# Patient Record
Sex: MALE | Race: WHITE | Employment: FULL TIME | URBAN - NONMETROPOLITAN AREA
[De-identification: names, ages, dates, MRNs, and addresses within clinical notes are randomized per-mention and may not be internally consistent; named-entity substitution may affect disease eponyms.]

---

## 2017-12-21 ENCOUNTER — OFFICE VISIT (OUTPATIENT)
Dept: NEUROLOGY | Age: 37
End: 2017-12-21
Payer: COMMERCIAL

## 2017-12-21 ENCOUNTER — HOSPITAL ENCOUNTER (OUTPATIENT)
Dept: GENERAL RADIOLOGY | Age: 37
Discharge: HOME | End: 2017-12-21
Payer: COMMERCIAL

## 2017-12-21 ENCOUNTER — HOSPITAL ENCOUNTER (OUTPATIENT)
Dept: LAB | Age: 37
Setting detail: SPECIMEN
Discharge: HOME | End: 2017-12-21
Payer: COMMERCIAL

## 2017-12-21 VITALS — DIASTOLIC BLOOD PRESSURE: 66 MMHG | OXYGEN SATURATION: 98 % | SYSTOLIC BLOOD PRESSURE: 118 MMHG | HEART RATE: 58 BPM

## 2017-12-21 DIAGNOSIS — G40.909 SEIZURE DISORDER (HCC): ICD-10-CM

## 2017-12-21 DIAGNOSIS — R53.82 CHRONIC FATIGUE: ICD-10-CM

## 2017-12-21 DIAGNOSIS — F39 MOOD DISORDER (HCC): ICD-10-CM

## 2017-12-21 DIAGNOSIS — R42 VERTIGO: ICD-10-CM

## 2017-12-21 DIAGNOSIS — M54.2 NECK PAIN: ICD-10-CM

## 2017-12-21 DIAGNOSIS — Q28.3 CEREBRAL CAVERNOMA: ICD-10-CM

## 2017-12-21 DIAGNOSIS — S09.90XS CLOSED HEAD INJURY, SEQUELA: ICD-10-CM

## 2017-12-21 DIAGNOSIS — R55 NEAR SYNCOPE: ICD-10-CM

## 2017-12-21 DIAGNOSIS — M54.40 CHRONIC LOW BACK PAIN WITH SCIATICA, SCIATICA LATERALITY UNSPECIFIED, UNSPECIFIED BACK PAIN LATERALITY: ICD-10-CM

## 2017-12-21 DIAGNOSIS — J32.4 CHRONIC PANSINUSITIS: ICD-10-CM

## 2017-12-21 DIAGNOSIS — R42 DIZZINESS: ICD-10-CM

## 2017-12-21 DIAGNOSIS — G43.009 MIGRAINE WITHOUT AURA AND WITHOUT STATUS MIGRAINOSUS, NOT INTRACTABLE: ICD-10-CM
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
## 2017-12-21 DIAGNOSIS — G89.29 CHRONIC LOW BACK PAIN WITH SCIATICA, SCIATICA LATERALITY UNSPECIFIED, UNSPECIFIED BACK PAIN LATERALITY: ICD-10-CM

## 2017-12-21 DIAGNOSIS — F34.1 DYSTHYMIA: ICD-10-CM

## 2017-12-21 DIAGNOSIS — R41.3 MEMORY PROBLEM: ICD-10-CM

## 2017-12-21 DIAGNOSIS — R90.89 ABNORMAL FINDING ON MRI OF BRAIN: ICD-10-CM

## 2017-12-21 DIAGNOSIS — G43.719 INTRACTABLE CHRONIC MIGRAINE WITHOUT AURA AND WITHOUT STATUS MIGRAINOSUS: Primary | ICD-10-CM

## 2017-12-21 DIAGNOSIS — G47.9 SLEEP DIFFICULTIES: ICD-10-CM

## 2017-12-21 LAB
ALBUMIN SERPL-MCNC: 4.5 G/DL (ref 3.5–5.2)
ALBUMIN/GLOBULIN RATIO: 1.2 (ref 1–2.5)
ALP BLD-CCNC: 93 U/L (ref 40–129)
ALT SERPL-CCNC: 26 U/L (ref 5–41)
AMPHETAMINE SCREEN URINE: NEGATIVE
ANION GAP SERPL CALCULATED.3IONS-SCNC: 10 MMOL/L (ref 9–17)
AST SERPL-CCNC: 25 U/L
BARBITURATE SCREEN URINE: NEGATIVE
BENZODIAZEPINE SCREEN, URINE: NEGATIVE
BILIRUB SERPL-MCNC: 0.72 MG/DL (ref 0.3–1.2)
BUN BLDV-MCNC: 16 MG/DL (ref 6–20)
BUN/CREAT BLD: 20 (ref 9–20)
BUPRENORPHINE URINE: NEGATIVE
CALCIUM SERPL-MCNC: 9.6 MG/DL (ref 8.6–10.4)
CANNABINOID SCREEN URINE: NEGATIVE
CHLORIDE BLD-SCNC: 100 MMOL/L (ref 98–107)
CO2: 29 MMOL/L (ref 20–31)
COCAINE METABOLITE, URINE: NEGATIVE
CREAT SERPL-MCNC: 0.79 MG/DL (ref 0.7–1.2)
GFR AFRICAN AMERICAN: >60 ML/MIN
GFR NON-AFRICAN AMERICAN: >60 ML/MIN
GFR SERPL CREATININE-BSD FRML MDRD: ABNORMAL ML/MIN/{1.73_M2}
GFR SERPL CREATININE-BSD FRML MDRD: ABNORMAL ML/MIN/{1.73_M2}
GLUCOSE BLD-MCNC: 96 MG/DL (ref 70–99)
HCT VFR BLD CALC: 44.4 % (ref 41–53)
HEMOGLOBIN: 15.3 G/DL (ref 13.5–17.5)
MCH RBC QN AUTO: 31 PG (ref 26–34)
MCHC RBC AUTO-ENTMCNC: 34.4 G/DL (ref 31–37)
MCV RBC AUTO: 90.2 FL (ref 80–100)
MDMA URINE: NORMAL
METHADONE SCREEN, URINE: NEGATIVE
METHAMPHETAMINE, URINE: NEGATIVE
OPIATES, URINE: NEGATIVE
OXYCODONE SCREEN URINE: NEGATIVE
PDW BLD-RTO: 12.6 % (ref 11–14.5)
PHENCYCLIDINE, URINE: NEGATIVE
PLATELET # BLD: 278 K/UL (ref 140–450)
PMV BLD AUTO: 6.8 FL (ref 6–12)
POTASSIUM SERPL-SCNC: 4.5 MMOL/L (ref 3.7–5.3)
PROPOXYPHENE, URINE: NEGATIVE
RBC # BLD: 4.93 M/UL (ref 4.5–5.9)
RHEUMATOID FACTOR: <10 IU/ML
SEDIMENTATION RATE, ERYTHROCYTE: 26 MM (ref 0–15)
SODIUM BLD-SCNC: 139 MMOL/L (ref 135–144)
T. PALLIDUM, IGG: NONREACTIVE
TEST INFORMATION: NORMAL
TOTAL PROTEIN: 8.4 G/DL (ref 6.4–8.3)
TRICYCLIC ANTIDEPRESSANTS, UR: NEGATIVE
TSH SERPL DL<=0.05 MIU/L-ACNC: 1.81 MIU/L (ref 0.3–5)
WBC # BLD: 6.6 K/UL (ref 3.5–11)

## 2017-12-21 PROCEDURE — G8484 FLU IMMUNIZE NO ADMIN: HCPCS | Performed by: PSYCHIATRY & NEUROLOGY

## 2017-12-21 PROCEDURE — 86038 ANTINUCLEAR ANTIBODIES: CPT

## 2017-12-21 PROCEDURE — 72050 X-RAY EXAM NECK SPINE 4/5VWS: CPT

## 2017-12-21 PROCEDURE — 85613 RUSSELL VIPER VENOM DILUTED: CPT

## 2017-12-21 PROCEDURE — 85610 PROTHROMBIN TIME: CPT

## 2017-12-21 PROCEDURE — 85027 COMPLETE CBC AUTOMATED: CPT

## 2017-12-21 PROCEDURE — 84443 ASSAY THYROID STIM HORMONE: CPT

## 2017-12-21 PROCEDURE — 86780 TREPONEMA PALLIDUM: CPT

## 2017-12-21 PROCEDURE — 36415 COLL VENOUS BLD VENIPUNCTURE: CPT

## 2017-12-21 PROCEDURE — G8427 DOCREV CUR MEDS BY ELIG CLIN: HCPCS | Performed by: PSYCHIATRY & NEUROLOGY

## 2017-12-21 PROCEDURE — 86431 RHEUMATOID FACTOR QUANT: CPT

## 2017-12-21 PROCEDURE — 85651 RBC SED RATE NONAUTOMATED: CPT

## 2017-12-21 PROCEDURE — 1036F TOBACCO NON-USER: CPT | Performed by: PSYCHIATRY & NEUROLOGY

## 2017-12-21 PROCEDURE — 82607 VITAMIN B-12: CPT

## 2017-12-21 PROCEDURE — 85730 THROMBOPLASTIN TIME PARTIAL: CPT

## 2017-12-21 PROCEDURE — 99205 OFFICE O/P NEW HI 60 MIN: CPT | Performed by: PSYCHIATRY & NEUROLOGY

## 2017-12-21 PROCEDURE — 82746 ASSAY OF FOLIC ACID SERUM: CPT

## 2017-12-21 PROCEDURE — 86147 CARDIOLIPIN ANTIBODY EA IG: CPT

## 2017-12-21 PROCEDURE — G8421 BMI NOT CALCULATED: HCPCS | Performed by: PSYCHIATRY & NEUROLOGY

## 2017-12-21 PROCEDURE — 80306 DRUG TEST PRSMV INSTRMNT: CPT

## 2017-12-21 PROCEDURE — 80053 COMPREHEN METABOLIC PANEL: CPT

## 2017-12-21 RX ORDER — ACETAMINOPHEN 325 MG/1
650 TABLET ORAL EVERY 6 HOURS PRN
COMMUNITY

## 2017-12-21 ASSESSMENT — ENCOUNTER SYMPTOMS
EYE REDNESS: 0
BLOOD IN STOOL: 0
PHOTOPHOBIA: 1
WHEEZING: 0
APNEA: 0
EYE WATERING: 0
CONSTIPATION: 0
FACIAL SWEATING: 0
ABDOMINAL PAIN: 0
CHEST TIGHTNESS: 0
VOMITING: 0
COUGH: 0
VOICE CHANGE: 0
EYE ITCHING: 0
EYE DISCHARGE: 0
BACK PAIN: 1
FACIAL SWELLING: 0
CHANGE IN BOWEL HABIT: 0
BLURRED VISION: 0
ABDOMINAL DISTENTION: 0
SWOLLEN GLANDS: 0
SCALP TENDERNESS: 0
NAUSEA: 0
BOWEL INCONTINENCE: 0
SINUS PRESSURE: 0
SHORTNESS OF BREATH: 0
EYE PAIN: 0
DIARRHEA: 0
COLOR CHANGE: 0
VISUAL CHANGE: 0
TROUBLE SWALLOWING: 0
SORE THROAT: 0
CHOKING: 0
RHINORRHEA: 0
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
## 2017-12-22 LAB
ANTI-NUCLEAR ANTIBODY (ANA): NEGATIVE
FOLATE: 9.9 NG/ML
VITAMIN B-12: 569 PG/ML (ref 232–1245)

## 2017-12-25 LAB
ANTICARDIOLIPIN IGA ANTIBODY: 1.4 APU
ANTICARDIOLIPIN IGG ANTIBODY: 3 GPU
CARDIOLIPIN AB IGM: 4 MPU
DILUTE RUSSELL VIPER VENOM TIME: NORMAL
INR BLD: 0.9
LUPUS ANTICOAG: NORMAL
PARTIAL THROMBOPLASTIN TIME: 30.7 SEC (ref 21.3–31.3)
PROTHROMBIN TIME: 10 SEC (ref 9.4–12.6)

## 2018-01-11 ENCOUNTER — HOSPITAL ENCOUNTER (OUTPATIENT)
Dept: MRI IMAGING | Age: 38
Discharge: HOME | End: 2018-01-11
Payer: COMMERCIAL

## 2018-01-11 ENCOUNTER — HOSPITAL ENCOUNTER (OUTPATIENT)
Dept: INTERVENTIONAL RADIOLOGY/VASCULAR | Age: 38
Discharge: HOME | End: 2018-01-11
Payer: COMMERCIAL

## 2018-01-11 DIAGNOSIS — R55 NEAR SYNCOPE: ICD-10-CM

## 2018-01-11 DIAGNOSIS — M54.2 NECK PAIN: ICD-10-CM

## 2018-01-11 DIAGNOSIS — S09.90XS CLOSED HEAD INJURY, SEQUELA: ICD-10-CM

## 2018-01-11 DIAGNOSIS — G43.009 MIGRAINE WITHOUT AURA AND WITHOUT STATUS MIGRAINOSUS, NOT INTRACTABLE: ICD-10-CM

## 2018-01-11 DIAGNOSIS — R41.3 MEMORY PROBLEM: ICD-10-CM

## 2018-01-11 DIAGNOSIS — R42 DIZZINESS: ICD-10-CM

## 2018-01-11 DIAGNOSIS — R42 VERTIGO: ICD-10-CM

## 2018-01-11 DIAGNOSIS — G40.909 SEIZURE DISORDER (HCC): ICD-10-CM

## 2018-01-11 PROCEDURE — 93880 EXTRACRANIAL BILAT STUDY: CPT

## 2018-01-11 PROCEDURE — 70544 MR ANGIOGRAPHY HEAD W/O DYE: CPT

## 2018-01-26 ENCOUNTER — HOSPITAL ENCOUNTER (OUTPATIENT)
Dept: NEUROLOGY | Age: 38
Discharge: HOME | End: 2018-01-26
Payer: COMMERCIAL

## 2018-01-26 DIAGNOSIS — R55 NEAR SYNCOPE: ICD-10-CM

## 2018-01-26 DIAGNOSIS — M54.2 NECK PAIN: ICD-10-CM

## 2018-01-26 DIAGNOSIS — G43.009 MIGRAINE WITHOUT AURA AND WITHOUT STATUS MIGRAINOSUS, NOT INTRACTABLE: ICD-10-CM

## 2018-01-26 DIAGNOSIS — R42 VERTIGO: ICD-10-CM

## 2018-01-26 DIAGNOSIS — G40.909 SEIZURE DISORDER (HCC): ICD-10-CM

## 2018-01-26 DIAGNOSIS — R41.3 MEMORY PROBLEM: ICD-10-CM

## 2018-01-26 DIAGNOSIS — R42 DIZZINESS: ICD-10-CM

## 2018-01-26 DIAGNOSIS — S09.90XS CLOSED HEAD INJURY, SEQUELA: ICD-10-CM

## 2018-01-26 PROCEDURE — 93892 TCD EMBOLI DETECT W/O INJ: CPT

## 2018-01-26 PROCEDURE — 93886 INTRACRANIAL COMPLETE STUDY: CPT

## 2018-01-26 NOTE — PROGRESS NOTES
TCD Completed With [facility]. PCP: Carole Fox    Ordering: Sally Mendoza Neurologist    Interpreting Physician: Xuan Garvey    Technician: Braydon Del Toro LPN

## 2018-04-20 ENCOUNTER — OFFICE VISIT (OUTPATIENT)
Dept: NEUROLOGY | Age: 38
End: 2018-04-20
Payer: COMMERCIAL

## 2018-04-20 VITALS
OXYGEN SATURATION: 98 % | BODY MASS INDEX: 30.22 KG/M2 | HEIGHT: 65 IN | DIASTOLIC BLOOD PRESSURE: 62 MMHG | SYSTOLIC BLOOD PRESSURE: 104 MMHG | WEIGHT: 181.4 LBS | HEART RATE: 56 BPM

## 2018-04-20 DIAGNOSIS — S09.90XS CLOSED HEAD INJURY, SEQUELA: ICD-10-CM

## 2018-04-20 DIAGNOSIS — J32.4 CHRONIC PANSINUSITIS: ICD-10-CM

## 2018-04-20 DIAGNOSIS — M54.40 CHRONIC LOW BACK PAIN WITH SCIATICA, SCIATICA LATERALITY UNSPECIFIED, UNSPECIFIED BACK PAIN LATERALITY: ICD-10-CM

## 2018-04-20 DIAGNOSIS — F39 MOOD DISORDER (HCC): ICD-10-CM

## 2018-04-20 DIAGNOSIS — G40.909 SEIZURE DISORDER (HCC): ICD-10-CM

## 2018-04-20 DIAGNOSIS — G47.9 SLEEP DIFFICULTIES: ICD-10-CM

## 2018-04-20 DIAGNOSIS — R90.89 ABNORMAL FINDING ON MRI OF BRAIN: ICD-10-CM

## 2018-04-20 DIAGNOSIS — Q28.3 CEREBRAL CAVERNOMA: ICD-10-CM

## 2018-04-20 DIAGNOSIS — G44.209 TENSION-TYPE HEADACHE, NOT INTRACTABLE, UNSPECIFIED CHRONICITY PATTERN: ICD-10-CM

## 2018-04-20 DIAGNOSIS — R42 DIZZINESS: ICD-10-CM

## 2018-04-20 DIAGNOSIS — G89.29 CHRONIC LOW BACK PAIN WITH SCIATICA, SCIATICA LATERALITY UNSPECIFIED, UNSPECIFIED BACK PAIN LATERALITY: ICD-10-CM

## 2018-04-20 DIAGNOSIS — G43.719 INTRACTABLE CHRONIC MIGRAINE WITHOUT AURA AND WITHOUT STATUS MIGRAINOSUS: Primary | ICD-10-CM

## 2018-04-20 DIAGNOSIS — R41.3 MEMORY PROBLEM: ICD-10-CM

## 2018-04-20 DIAGNOSIS — R42 VERTIGO: ICD-10-CM

## 2018-04-20 DIAGNOSIS — M54.2 NECK PAIN: ICD-10-CM

## 2018-04-20 DIAGNOSIS — R53.82 CHRONIC FATIGUE: ICD-10-CM

## 2018-04-20 DIAGNOSIS — R55 NEAR SYNCOPE: ICD-10-CM

## 2018-04-20 PROCEDURE — 1036F TOBACCO NON-USER: CPT | Performed by: PSYCHIATRY & NEUROLOGY

## 2018-04-20 PROCEDURE — 99215 OFFICE O/P EST HI 40 MIN: CPT | Performed by: PSYCHIATRY & NEUROLOGY

## 2018-04-20 PROCEDURE — G8417 CALC BMI ABV UP PARAM F/U: HCPCS | Performed by: PSYCHIATRY & NEUROLOGY

## 2018-04-20 PROCEDURE — G8427 DOCREV CUR MEDS BY ELIG CLIN: HCPCS | Performed by: PSYCHIATRY & NEUROLOGY

## 2018-04-20 RX ORDER — ONDANSETRON 4 MG/1
4 TABLET, ORALLY DISINTEGRATING ORAL EVERY 8 HOURS PRN
Qty: 30 TABLET | Refills: 1 | Status: SHIPPED | OUTPATIENT
Start: 2018-04-20

## 2018-04-20 RX ORDER — DIVALPROEX SODIUM 500 MG/1
TABLET, EXTENDED RELEASE ORAL
Qty: 30 TABLET | Refills: 1 | Status: SHIPPED | OUTPATIENT
Start: 2018-04-20

## 2018-04-20 RX ORDER — RANITIDINE 300 MG/1
300 TABLET ORAL NIGHTLY
Qty: 30 TABLET | Refills: 1 | Status: SHIPPED | OUTPATIENT
Start: 2018-04-20

## 2018-04-20 RX ORDER — NAPROXEN 500 MG/1
500 TABLET ORAL 2 TIMES DAILY WITH MEALS
Qty: 60 TABLET | Refills: 3 | Status: SHIPPED | OUTPATIENT
Start: 2018-04-20 | End: 2019-04-20

## 2018-04-20 ASSESSMENT — ENCOUNTER SYMPTOMS
BOWEL INCONTINENCE: 0
BLURRED VISION: 0
CHEST TIGHTNESS: 0
COLOR CHANGE: 0
EYE WATERING: 0
RHINORRHEA: 0
EYE DISCHARGE: 0
NAUSEA: 0
SWOLLEN GLANDS: 0
SORE THROAT: 0
BLOOD IN STOOL: 0
ABDOMINAL PAIN: 0
EYE ITCHING: 0
EYE PAIN: 0
EYE REDNESS: 0
CHOKING: 0
BACK PAIN: 1
SHORTNESS OF BREATH: 0
COUGH: 0
TROUBLE SWALLOWING: 0
VISUAL CHANGE: 0
SINUS PRESSURE: 0
PHOTOPHOBIA: 1
ABDOMINAL DISTENTION: 0
FACIAL SWELLING: 0
FACIAL SWEATING: 0
VOICE CHANGE: 0
CONSTIPATION: 0
CHANGE IN BOWEL HABIT: 0
SCALP TENDERNESS: 0
APNEA: 0
WHEEZING: 0
DIARRHEA: 0
VOMITING: 0

## 2018-04-20 ASSESSMENT — PATIENT HEALTH QUESTIONNAIRE - PHQ9
SUM OF ALL RESPONSES TO PHQ9 QUESTIONS 1 & 2: 1
2. FEELING DOWN, DEPRESSED OR HOPELESS: 1
SUM OF ALL RESPONSES TO PHQ QUESTIONS 1-9: 1
1. LITTLE INTEREST OR PLEASURE IN DOING THINGS: 0

## 2020-11-03 PROBLEM — R42 DIZZINESS: Status: RESOLVED | Noted: 2020-11-03 | Resolved: 2020-11-03

## 2021-11-29 ENCOUNTER — OFFICE VISIT (OUTPATIENT)
Dept: OPTOMETRY | Age: 41
End: 2021-11-29
Payer: COMMERCIAL

## 2021-11-29 DIAGNOSIS — H52.13 MYOPIA OF BOTH EYES WITH ASTIGMATISM: Primary | ICD-10-CM

## 2021-11-29 DIAGNOSIS — H17.9 CORNEAL SCAR, LEFT EYE: ICD-10-CM

## 2021-11-29 DIAGNOSIS — H52.203 MYOPIA OF BOTH EYES WITH ASTIGMATISM: Primary | ICD-10-CM

## 2021-11-29 PROCEDURE — 92004 COMPRE OPH EXAM NEW PT 1/>: CPT | Performed by: OPTOMETRIST

## 2021-11-29 PROCEDURE — 92015 DETERMINE REFRACTIVE STATE: CPT | Performed by: OPTOMETRIST

## 2021-11-29 ASSESSMENT — REFRACTION_MANIFEST
OS_SPHERE: +1.75
OS_AXIS: 075
OD_SPHERE: +1.00
OD_CYLINDER: -0.25
OS_CYLINDER: -1.00
OD_AXIS: 022

## 2021-11-29 ASSESSMENT — ENCOUNTER SYMPTOMS
ALLERGIC/IMMUNOLOGIC NEGATIVE: 0
EYES NEGATIVE: 0
RESPIRATORY NEGATIVE: 0
GASTROINTESTINAL NEGATIVE: 0

## 2021-11-29 ASSESSMENT — REFRACTION
OD_CYLINDER: +22.00
OD_SPHERE: -0.25

## 2021-11-29 ASSESSMENT — KERATOMETRY
OS_K2POWER_DIOPTERS: 48.25
OS_K1POWER_DIOPTERS: 47.50
OS_AXISANGLE2_DEGREES: 026
OS_AXISANGLE_DEGREES: 116

## 2021-11-29 ASSESSMENT — VISUAL ACUITY
OS_SC: 20/25
OS_SC+: -1
OD_SC: 20/20 OU
METHOD: SNELLEN - LINEAR
OD_SC: 20/20

## 2021-11-29 ASSESSMENT — SLIT LAMP EXAM - LIDS
COMMENTS: NORMAL
COMMENTS: NORMAL

## 2021-11-29 ASSESSMENT — TONOMETRY
IOP_METHOD: NON-CONTACT AIR PUFF
OD_IOP_MMHG: 14
OS_IOP_MMHG: 14
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
## 2024-01-03 RX ORDER — BUSPIRONE HYDROCHLORIDE 5 MG/1
5 TABLET ORAL 3 TIMES DAILY
COMMUNITY

## 2024-01-03 RX ORDER — CYCLOBENZAPRINE HCL 10 MG
10 TABLET ORAL 2 TIMES DAILY PRN
COMMUNITY

## 2024-01-03 RX ORDER — HYDROXYZINE HYDROCHLORIDE 10 MG/1
10 TABLET, FILM COATED ORAL 2 TIMES DAILY PRN
COMMUNITY

## 2024-01-03 RX ORDER — VENLAFAXINE HYDROCHLORIDE 150 MG/1
150 CAPSULE, EXTENDED RELEASE ORAL DAILY
COMMUNITY

## 2024-01-03 RX ORDER — PRAZOSIN HYDROCHLORIDE 2 MG/1
2 CAPSULE ORAL NIGHTLY
COMMUNITY

## 2024-01-03 RX ORDER — ERGOCALCIFEROL 1.25 MG/1
50000 CAPSULE ORAL WEEKLY
COMMUNITY

## 2024-01-03 RX ORDER — SUCRALFATE 1 G/1
1 TABLET ORAL 4 TIMES DAILY
COMMUNITY

## 2024-01-03 RX ORDER — OMEPRAZOLE 20 MG/1
20 CAPSULE, DELAYED RELEASE ORAL DAILY PRN
COMMUNITY

## 2024-01-04 ENCOUNTER — TELEPHONE (OUTPATIENT)
Dept: NEUROLOGY | Age: 44
End: 2024-01-04

## 2024-04-03 ENCOUNTER — OFFICE VISIT (OUTPATIENT)
Dept: NEUROLOGY | Age: 44
End: 2024-04-03
Payer: COMMERCIAL

## 2024-04-03 ENCOUNTER — HOSPITAL ENCOUNTER (OUTPATIENT)
Age: 44
Discharge: HOME | End: 2024-04-03
Payer: COMMERCIAL

## 2024-04-03 VITALS
HEART RATE: 77 BPM | SYSTOLIC BLOOD PRESSURE: 122 MMHG | DIASTOLIC BLOOD PRESSURE: 74 MMHG | BODY MASS INDEX: 37.11 KG/M2 | WEIGHT: 223 LBS | OXYGEN SATURATION: 98 %

## 2024-04-03 DIAGNOSIS — G47.9 SLEEP DIFFICULTIES: ICD-10-CM

## 2024-04-03 DIAGNOSIS — F32.89 OTHER DEPRESSION: ICD-10-CM

## 2024-04-03 DIAGNOSIS — F32.A ANXIETY AND DEPRESSION: ICD-10-CM

## 2024-04-03 DIAGNOSIS — G89.29 CHRONIC BILATERAL LOW BACK PAIN WITH SCIATICA, SCIATICA LATERALITY UNSPECIFIED: ICD-10-CM

## 2024-04-03 DIAGNOSIS — G93.89 BRAIN DYSFUNCTION: ICD-10-CM

## 2024-04-03 DIAGNOSIS — R40.4 STARING EPISODES: ICD-10-CM

## 2024-04-03 DIAGNOSIS — R42 DIZZINESS: ICD-10-CM

## 2024-04-03 DIAGNOSIS — G40.909 SEIZURE DISORDER (HCC): ICD-10-CM

## 2024-04-03 DIAGNOSIS — M54.40 CHRONIC BILATERAL LOW BACK PAIN WITH SCIATICA, SCIATICA LATERALITY UNSPECIFIED: ICD-10-CM

## 2024-04-03 DIAGNOSIS — G89.29 CHRONIC NECK AND BACK PAIN: ICD-10-CM

## 2024-04-03 DIAGNOSIS — Q28.3 CEREBRAL CAVERNOMA: ICD-10-CM

## 2024-04-03 DIAGNOSIS — S09.90XS CLOSED HEAD INJURY, SEQUELA: ICD-10-CM

## 2024-04-03 DIAGNOSIS — R90.89 ABNORMAL FINDING ON MRI OF BRAIN: ICD-10-CM

## 2024-04-03 DIAGNOSIS — G44.1 OTHER VASCULAR HEADACHE: ICD-10-CM

## 2024-04-03 DIAGNOSIS — M54.9 CHRONIC NECK AND BACK PAIN: ICD-10-CM

## 2024-04-03 DIAGNOSIS — G43.009 MIGRAINE WITHOUT AURA AND WITHOUT STATUS MIGRAINOSUS, NOT INTRACTABLE: Primary | ICD-10-CM

## 2024-04-03 DIAGNOSIS — F39 MOOD DISORDER (HCC): ICD-10-CM

## 2024-04-03 DIAGNOSIS — R41.3 MEMORY PROBLEM: ICD-10-CM

## 2024-04-03 DIAGNOSIS — F41.9 ANXIETY AND DEPRESSION: ICD-10-CM

## 2024-04-03 DIAGNOSIS — M54.2 NECK PAIN: ICD-10-CM

## 2024-04-03 DIAGNOSIS — R55 NEAR SYNCOPE: ICD-10-CM

## 2024-04-03 DIAGNOSIS — R42 DIZZINESS: Primary | ICD-10-CM

## 2024-04-03 DIAGNOSIS — M54.2 CHRONIC NECK AND BACK PAIN: ICD-10-CM

## 2024-04-03 DIAGNOSIS — I67.82 CHRONIC CEREBRAL ISCHEMIA: ICD-10-CM

## 2024-04-03 LAB
BASOPHILS # BLD: 0.04 K/UL (ref 0–0.2)
BASOPHILS NFR BLD: 1 % (ref 0–2)
EOSINOPHIL # BLD: 0.22 K/UL (ref 0–0.44)
EOSINOPHILS RELATIVE PERCENT: 3 % (ref 1–4)
ERYTHROCYTE [DISTWIDTH] IN BLOOD BY AUTOMATED COUNT: 12.4 % (ref 11.8–14.4)
ERYTHROCYTE [SEDIMENTATION RATE] IN BLOOD BY PHOTOMETRIC METHOD: 5 MM/HR (ref 0–15)
FOLATE SERPL-MCNC: 7.9 NG/ML (ref 4.8–24.2)
HCT VFR BLD AUTO: 42.2 % (ref 40.7–50.3)
HGB BLD-MCNC: 14.6 G/DL (ref 13–17)
IMM GRANULOCYTES # BLD AUTO: 0.04 K/UL (ref 0–0.3)
IMM GRANULOCYTES NFR BLD: 1 %
LYMPHOCYTES NFR BLD: 1.53 K/UL (ref 1.1–3.7)
LYMPHOCYTES RELATIVE PERCENT: 24 % (ref 24–43)
MCH RBC QN AUTO: 31.2 PG (ref 25.2–33.5)
MCHC RBC AUTO-ENTMCNC: 34.6 G/DL (ref 25.2–33.5)
MCV RBC AUTO: 90.2 FL (ref 82.6–102.9)
MONOCYTES NFR BLD: 0.45 K/UL (ref 0.1–1.2)
MONOCYTES NFR BLD: 7 % (ref 3–12)
NEUTROPHILS NFR BLD: 64 % (ref 36–65)
NEUTS SEG NFR BLD: 4.14 K/UL (ref 1.5–8.1)
NRBC BLD-RTO: 0 PER 100 WBC
PLATELET # BLD AUTO: 206 K/UL (ref 138–453)
PMV BLD AUTO: 8.5 FL (ref 8.1–13.5)
RBC # BLD AUTO: 4.68 M/UL (ref 4.21–5.77)
RHEUMATOID FACT SER NEPH-ACNC: <10 IU/ML (ref 0–13)
T PALLIDUM AB SER QL IA: NONREACTIVE
TSH SERPL DL<=0.05 MIU/L-ACNC: 1.89 UIU/ML (ref 0.3–5)
VIT B12 SERPL-MCNC: 591 PG/ML (ref 232–1245)
WBC OTHER # BLD: 6.4 K/UL (ref 3.5–11.3)

## 2024-04-03 PROCEDURE — 85730 THROMBOPLASTIN TIME PARTIAL: CPT

## 2024-04-03 PROCEDURE — 84443 ASSAY THYROID STIM HORMONE: CPT

## 2024-04-03 PROCEDURE — 86431 RHEUMATOID FACTOR QUANT: CPT

## 2024-04-03 PROCEDURE — 85025 COMPLETE CBC W/AUTO DIFF WBC: CPT

## 2024-04-03 PROCEDURE — 82746 ASSAY OF FOLIC ACID SERUM: CPT

## 2024-04-03 PROCEDURE — 86147 CARDIOLIPIN ANTIBODY EA IG: CPT

## 2024-04-03 PROCEDURE — 85652 RBC SED RATE AUTOMATED: CPT

## 2024-04-03 PROCEDURE — 86780 TREPONEMA PALLIDUM: CPT

## 2024-04-03 PROCEDURE — 86038 ANTINUCLEAR ANTIBODIES: CPT

## 2024-04-03 PROCEDURE — 36415 COLL VENOUS BLD VENIPUNCTURE: CPT

## 2024-04-03 PROCEDURE — 86225 DNA ANTIBODY NATIVE: CPT

## 2024-04-03 PROCEDURE — 99204 OFFICE O/P NEW MOD 45 MIN: CPT | Performed by: PSYCHIATRY & NEUROLOGY

## 2024-04-03 PROCEDURE — 85610 PROTHROMBIN TIME: CPT

## 2024-04-03 PROCEDURE — 82607 VITAMIN B-12: CPT

## 2024-04-03 PROCEDURE — 85613 RUSSELL VIPER VENOM DILUTED: CPT

## 2024-04-03 RX ORDER — PREDNISONE 20 MG/1
40 TABLET ORAL DAILY
COMMUNITY
Start: 2024-04-01 | End: 2024-04-06

## 2024-04-03 RX ORDER — PANTOPRAZOLE SODIUM 40 MG/1
TABLET, DELAYED RELEASE ORAL
COMMUNITY
Start: 2021-11-30

## 2024-04-03 RX ORDER — AMOXICILLIN AND CLAVULANATE POTASSIUM 875; 125 MG/1; MG/1
TABLET, FILM COATED ORAL
COMMUNITY
Start: 2024-03-19

## 2024-04-03 RX ORDER — SACCHAROMYCES BOULARDII 250 MG
250 CAPSULE ORAL 2 TIMES DAILY
COMMUNITY
Start: 2024-03-25

## 2024-04-03 RX ORDER — FLUTICASONE PROPIONATE 50 MCG
2 SPRAY, SUSPENSION (ML) NASAL DAILY
COMMUNITY
Start: 2024-03-25 | End: 2024-04-24

## 2024-04-03 RX ORDER — ALBUTEROL SULFATE 90 UG/1
2 AEROSOL, METERED RESPIRATORY (INHALATION) EVERY 4 HOURS PRN
COMMUNITY
Start: 2024-03-25

## 2024-04-03 RX ORDER — DICYCLOMINE HCL 20 MG
20 TABLET ORAL 3 TIMES DAILY PRN
COMMUNITY
Start: 2024-03-25

## 2024-04-03 ASSESSMENT — ENCOUNTER SYMPTOMS
SINUS PRESSURE: 0
VOICE CHANGE: 0
VOMITING: 0
NAUSEA: 0
ABDOMINAL PAIN: 0
VISUAL CHANGE: 0
EYE ITCHING: 0
FACIAL SWELLING: 0
EYE REDNESS: 0
APNEA: 0
EYE PAIN: 0
ABDOMINAL DISTENTION: 0
BLOOD IN STOOL: 0
CHEST TIGHTNESS: 0
CONSTIPATION: 0
SHORTNESS OF BREATH: 0
DIARRHEA: 0
COLOR CHANGE: 0
COUGH: 0
EYE DISCHARGE: 0
SORE THROAT: 0
TROUBLE SWALLOWING: 0
WHEEZING: 0
PHOTOPHOBIA: 0
BACK PAIN: 1
CHOKING: 0

## 2024-04-03 NOTE — PATIENT INSTRUCTIONS
*   SEIZURE  PRECAUTIONS.                 A)  Avoid  Working  At   Heights.          B)  Avoid  Working  With  Heavy machinery.          C)  Avoid   Swimming,  Climbing  A  Ladder   Unattended.          D)  Avoid   Driving   If  You   Have  A  Seizure.          E)  Must   Be  Seizure  Free   For  At   Least   6 months,  Before   You  Can drive.                                                                                                                                    F) Some times  Your  May  Feel  Seizure coming  Before  It  Begins.  You  May feel             Strange smell or funny  Feeling  In  Your  Stomach,  Which is  Called   Aura.                     TIPS  TO  REDUCE/ PREVENT  SEIZURES         1.  Take  Your  Anti seizure  Medications   As   Recommended.       2. Get   Enough   Sleep.   Sleep  Deprivation   Can  Trigger  A  Seizure.       3. If   You   Have  A fever,  Treat  It  At  Once,  And  Contact   Your  Primary  Care Providers.                                                                                                                           4. Avoid   Alcohol.       5. Avoid  Flashing  Lights,  Loud  Noises and  TV  And  Video  Games,           As   These  May  Trigger   Your  Seizures       6.  Control  Your  Stress  And   Have  Adequate  Rest.       7.   If  You  Feel  A  Seizure  Coming   On :           A) warn people  Who  Are  With  You           B)  Make  Sure  There  Are  No  Sharp or  Hard  Objects  Around you.           C)  Lay down  On  Your  Side  And  Relax.             *   ADEQUATE   FLUID  INTAKE   AND  ELECTROLYTE  BALANCE             * KEEP  DAIRY  OF   THE  NEUROLOGICAL  SYMPTOMS          *  TO  MAINTAIN  REGULAR  SLEEP  WAKE  CYCLES.     *   TO  HAVE  ADEQUATE  REST  AND   SLEEP    HOURS.          *    AVOID  USAGE OF   TOBACCO,  EXCESSIVE  ALCOHOL                AND   ILLEGAL   SUBSTANCES,  IF  ANY          *  Maintain   Healthy  Life Style    With   Periodic  Monitoring

## 2024-04-03 NOTE — PROGRESS NOTES
EEG, EMG upper/lower, MRI brain, CT cervical spine, carotid and TCD scheduled    Orders placed for labs    Referral to Cardiology  
CHRONIC  SINUS  DISEASE                     13)   H/O   CHRONIC  FATIGUE    SINCE  TEENAGE                     14)  H/O   CHRONIC     SLEEP  DIFFICULTIES                                15)   H/O   CHRONIC  SINUS  PROBLEMS  AND  DISEASE    SINCE  TEENAGER                            PATIENT  TO  FOLLOW  WITH  HIS  PCP &   ENT                                                 16)     IN  VIEW  OF  THE  PATIENT'S    MULTIPLE   NEUROLOGICAL                           SYMPTOMS  AND  CONCERNS    FOR  PROLONGED   DURATION,                           AND    MULTIPLE   CO MORBID  MEDICAL  CONDITIONS,                           PATIENT    NEEDS  NEURO  DIAGNOSTIC  EVALUATIONS                      FOR   ANY   NEUROLOGICAL  PATHOLOGIES    AND  OTHER                        CORRECTABLE   ETIOLOGIES;     AND                              PATIENT  REQUESTS   THE  SAME.                          17)        AVAILABLE   PREVIOUS   MEDICAL  RECORDS      AND                               RESULTS /    REPORTS     REVIEWED    IN   DETAIL      WITH     PATIENT                         18)       VARIOUS  RISK   FACTORS   WERE  REVIEWED   AND   DISCUSSED.                       PATIENT   HAS  MULTIPLE   MEDICAL, NEUROLOGICAL                        AND   MENTAL HEALTH   PROBLEMS .                    PATIENT'S   MANAGEMENT  IS  CHALLENGING.                                        PRECIPITATING  FACTORS: including  fever/infection, exertion/relaxation, position change, stress,                weather change,   medications/alcohol, time of day/darkness/light  Are  present                                                          MODIFYING  FACTORS:  fever/infection, exertion/relaxation, position change, stress, weather change,               medications/alcohol, time of day/darkness/light  Are  present                Patient   Indicates   The  Presence   And  The  Absence  Of  The  Following    Associated  And             Additional  Neurological

## 2024-04-06 LAB
ANA SER QL IA: NEGATIVE
CARDIOLIPIN IGA SER IA-ACNC: 1.6 APL (ref 0–14)
CARDIOLIPIN IGG SER IA-ACNC: 0.7 GPL (ref 0–10)
CARDIOLIPIN IGM SER IA-ACNC: <0.8 MPL (ref 0–10)
DILUTE RUSSELL VIPER VENOM TIME: NORMAL
DSDNA IGG SER QL IA: <0.5 IU/ML
INR PPP: 0.9
LUPUS ANTICOAG: NORMAL
NUCLEAR IGG SER IA-RTO: 0.1 U/ML
PARTIAL THROMBOPLASTIN TIME: 30 SEC (ref 23–36.5)
PROTHROMBIN TIME: 12.2 SEC (ref 11.7–14.9)

## 2024-04-08 ENCOUNTER — HOSPITAL ENCOUNTER (OUTPATIENT)
Dept: NEUROLOGY | Age: 44
Discharge: HOME | End: 2024-04-08
Payer: COMMERCIAL

## 2024-04-08 DIAGNOSIS — G93.89 BRAIN DYSFUNCTION: ICD-10-CM

## 2024-04-08 DIAGNOSIS — R42 DIZZINESS: ICD-10-CM

## 2024-04-08 LAB
CARDIOLIPIN IGA SER IA-ACNC: 1.6 APL (ref 0–14)
CARDIOLIPIN IGG SER IA-ACNC: 0.7 GPL (ref 0–10)
CARDIOLIPIN IGM SER IA-ACNC: <0.8 MPL (ref 0–10)
DILUTE RUSSELL VIPER VENOM TIME: NORMAL
INR PPP: 0.9
PARTIAL THROMBOPLASTIN TIME: 30 SEC (ref 23–36.5)
PROTHROMBIN TIME: 12.2 SEC (ref 11.7–14.9)

## 2024-04-08 PROCEDURE — 95813 EEG EXTND MNTR 61-119 MIN: CPT

## 2024-04-08 PROCEDURE — 93892 TCD EMBOLI DETECT W/O INJ: CPT

## 2024-04-08 PROCEDURE — 93886 INTRACRANIAL COMPLETE STUDY: CPT

## 2024-04-08 NOTE — PROGRESS NOTES
TCD Completed with Emboli Detection.    PCP: Mahsa Sanz MD    Ordering: James Mendoza Neurologist    Interpreting Physician: James Mendoza Neurologfrantz    Electronically signed by Kaylee Reyes RCP on 4/8/2024 at 8:44 AM

## 2024-04-08 NOTE — PROGRESS NOTES
EXTENDED EEG Completed with G[facility]mez Analysis.    PCP: Mahsa Sanz MD    Ordering: James Mendoza Neurologist    Interpreting Physician: Kelly Quarles Neurologfrantz    Technician: Carine Lagos RN

## 2024-04-09 ENCOUNTER — HOSPITAL ENCOUNTER (OUTPATIENT)
Dept: NEUROLOGY | Age: 44
Discharge: HOME | End: 2024-04-09
Payer: COMMERCIAL

## 2024-04-09 DIAGNOSIS — G93.89 BRAIN DYSFUNCTION: ICD-10-CM

## 2024-04-09 PROCEDURE — 95912 NRV CNDJ TEST 11-12 STUDIES: CPT

## 2024-04-09 PROCEDURE — 95886 MUSC TEST DONE W/N TEST COMP: CPT

## 2024-04-09 NOTE — PROGRESS NOTES
EMG/NCS Bilateral    upper Completed    PCP: Mahsa Sanz MD    Ordering: James Mendoza Neurologist    Interpreting Physician: James Mendoza Neurologist    Technician: Carine Lagos RN

## 2024-04-10 NOTE — PROCEDURES
[facility]                [address] [place] [address]                    TRANSCRANIAL DOPPLER (TCD) STUDY      PATIENT NAME: JERRICA SANTIAGO                 : 1980  MED REC NO: [id number]                         ROOM:   ACCOUNT NO: [id number]                       ADMIT DATE: 2024      PROVIDER: James Mendoza MD      TECHNIQUE:  Transcranial Doppler study of intracranial arteries was performed using [employer] equipment with digital Doppler technology, with high resolution 250 [address] M-mode display and Multigate Spectral Windows and 2 MHz Doppler probes via temporal, suboccipital, and orbital approaches.    Transcranial Doppler study of the intracranial arteries was also performed for emboli detection without intravenous microbubble injection using continuous soundtrack, M-mode with multigate spectral displays and soundtrack displays with continuous bilateral monitoring.    CLINICAL DATA:      The patient is 44 years old with a history of memory problems, near syncope, dizziness, anxiety, depression, migraines, cerebral cavernoma, chronic migraines, chronic cerebral ischemia.    The purpose of the study is to evaluate for stroke, intracranial focal stenosis, flow abnormalities, and vertebrobasilar insufficiency evaluations.    SUMMARY:      The mean flow velocities in the right middle cerebral artery within normal limits with normal PI values.    Otherwise, mean flow velocities in the left middle and both anterior and posterior cerebral arteries are low with normal PI values.    Mean flow velocities in the right and left vertebral arteries, basilar artery are low with normal PI values.      TCD OF INTRACRANIAL ARTERIES FOR EMBOLI DETECTION:    Review and analysis of the waveforms and continuous soundtrack systems during the current monitoring show no evidence of HITS (high intensity transient signals) and no embolic shower events were 
significant abnormality.      IMPRESSION:        No significant focal, lateralized, or epileptiform features noted during the current recording.      Further clinical correlation and followup recommended.        YU GARCIA MD, FAAN     Board Certified in Neurology  & in  Brain Injury Medicine  American Board of Psychiatry and Neurology (ABPN).        D:  04/09/2024 12:14:10     T:  04/09/2024 13:32:11     PNP/AQS  Job #:  [id number]     Doc#:  [id number]

## 2024-04-10 NOTE — PROCEDURES
[address] [place] [address]                   EMG/NERVE CONDUCTION STUDIES REPORT      PATIENT NAME: JERRICA SANTIAGO                 : 1980  MED REC NO: [id number]                         ROOM:   ACCOUNT NO: [id number]                       ADMIT DATE: 2024    PROVIDER: Yu Garcia MD        CLINICAL DATA:   The patient is 44 years old with a history of pain in the hands and wrists, history of neck pain for 2 to 3 years.  Patient complains of weakness involving both upper extremity, dropping objects.  No history of diabetes.    The purpose of study is to evaluate for mononeuropathy, radiculopathy, peripheral polyneuropathy.    SUMMARY:  The sensory nerve action potentials of the right and left radial nerves are within normal limits bilaterally.    Sensory nerve action potentials of the right and left median nerve show low to borderline amplitude and prolonged distal latencies.  Ulnar sensory nerve action potentials are within normal limits.    Mixed nerve palmar potential shows increased median to ulnar distal latency difference bilaterally.    Compound muscle action potentials of the right and left median and ulnar nerve show normal amplitude distal latencies, conduction velocities bilaterally.    Proximal conductions as measured with F responses are normal in both median and ulnar nerves bilaterally.    Needle electromyography shows no significant abnormalities.    IMPRESSION:      1. There is electrodiagnostic evidence of mild to moderate median mononeuropathy at the wrist (carpal tunnel syndrome) bilaterally.  2. There is no definite electrodiagnostic evidence of any other mononeuropathy, radiculopathy, peripheral polyneuropathy involving both upper extremities.  Further clinical correlation and followup recommended.          YU GARCIA MD      D:  2024 10:54:01     T:  2024 11:42:45     PNP/AQS  Job 
                [facility]                [address] [place] [address]                   EMG/NERVE CONDUCTION STUDIES REPORT      PATIENT NAME: JERRICA SANTIAGO                 : 1980  MED REC NO: [id number]                         ROOM:   ACCOUNT NO: [id number]                       ADMIT DATE: 2024    PROVIDER: James Mendoza MD        TECHNICAL SUMMARY::  The nature, purpose, goals, expectations and process involved in the procedures of nerve conduction studies and needle electromyography were reviewed, discussed, explained and verbal consent was obtained from the patient. The patient's questions were answered with reference to the above processes and procedures.  There were no significant technical difficulties encountered during this study and nerve conduction studies were performed under temperature monitoring.      CLINICAL DATA:      The patient is 44 years old with a history of pain in the hands and wrists, history of neck pain for 2 to 3 years.  Patient complains of weakness involving both upper extremity, dropping objects.  No history of diabetes.    The purpose of study is to evaluate for mononeuropathy, radiculopathy, peripheral polyneuropathy.      SUMMARY:      The sensory nerve action potentials of the right and left radial nerves are within normal limits bilaterally.    Sensory nerve action potentials of the right and left median nerve show low to borderline amplitude and prolonged distal latencies.  Ulnar sensory nerve action potentials are within normal limits.    Mixed nerve palmar potential shows increased median to ulnar distal latency difference bilaterally.    Compound muscle action potentials of the right and left median and ulnar nerve show normal amplitude distal latencies, conduction velocities bilaterally.    Proximal conductions as measured with F responses are normal in both median and ulnar nerves bilaterally.         Nerve  Conductions   
Results  Were  Personally  Reviewed and  Analysed.   Abnormal  Nerve  Conductions  Were  Personally  Repeated,  Verified, reconfirmed  And  Updated as needed  appropriately.       Please    See   Wave  Forms   And    Details  Of     Nerve  Conduction   Studies   For  Additional  Information             Extensive   Needle  Electromyography  Was personally  Performed  In  Both       Upper  Extremities     In  The  Following  Muscles :    Deltoid,  Biceps  Brachii,  Triceps . Pronator  Teres,  Flexor Carpi Ulnaris,    Ext. Digitorum Communis,  FDI (Hand)        Extensive  Needle  Electromyography  Shows  No   Abnormality with :       A) Normal  insertional  Activity.      There  Is  Absence  Of   P  Waves,  Fibrillations,  Fasciculations and        Other  Spontaneous   Activity.     B) Voluntary  Motor unit  Potentials    Show :    Normal  Effort,  Recruitment, amplitude,  Duration.     No Polyphasia  Noted.         IMPRESSION:        1. There is electrodiagnostic evidence of mild to moderate median mononeuropathy at the wrist (carpal tunnel syndrome)                bilaterally.    2. There is no definite electrodiagnostic evidence of any other mononeuropathy, radiculopathy, peripheral polyneuropathy                    involving examined both upper extremities.             Further clinical correlation and followup recommended.        YU GARCIA MD, FAAN     Board Certified in Neurology  & in  Brain Injury Medicine  American Board of Psychiatry and Neurology (ABPN).        D:  04/09/2024 10:54:01     T:  04/09/2024 11:42:45     EUGENIE/STEVEN  Job #:  [id number]     Doc#:  [id number]

## 2024-04-16 ENCOUNTER — HOSPITAL ENCOUNTER (OUTPATIENT)
Dept: MRI IMAGING | Age: 44
Discharge: HOME | End: 2024-04-18
Attending: PSYCHIATRY & NEUROLOGY
Payer: COMMERCIAL

## 2024-04-16 ENCOUNTER — HOSPITAL ENCOUNTER (OUTPATIENT)
Dept: INTERVENTIONAL RADIOLOGY/VASCULAR | Age: 44
Discharge: HOME | End: 2024-04-18
Attending: PSYCHIATRY & NEUROLOGY
Payer: COMMERCIAL

## 2024-04-16 ENCOUNTER — HOSPITAL ENCOUNTER (OUTPATIENT)
Dept: NEUROLOGY | Age: 44
Discharge: HOME | End: 2024-04-16
Attending: PSYCHIATRY & NEUROLOGY
Payer: COMMERCIAL

## 2024-04-16 DIAGNOSIS — G93.89 BRAIN DYSFUNCTION: ICD-10-CM

## 2024-04-16 PROCEDURE — 95886 MUSC TEST DONE W/N TEST COMP: CPT

## 2024-04-16 PROCEDURE — A9579 GAD-BASE MR CONTRAST NOS,1ML: HCPCS | Performed by: PSYCHIATRY & NEUROLOGY

## 2024-04-16 PROCEDURE — 72141 MRI NECK SPINE W/O DYE: CPT

## 2024-04-16 PROCEDURE — 70553 MRI BRAIN STEM W/O & W/DYE: CPT

## 2024-04-16 PROCEDURE — 6360000004 HC RX CONTRAST MEDICATION: Performed by: PSYCHIATRY & NEUROLOGY

## 2024-04-16 PROCEDURE — 93880 EXTRACRANIAL BILAT STUDY: CPT

## 2024-04-16 PROCEDURE — 95911 NRV CNDJ TEST 9-10 STUDIES: CPT

## 2024-04-16 RX ADMIN — GADOTERIDOL 15 ML: 279.3 INJECTION, SOLUTION INTRAVENOUS at 09:23
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
## 2024-04-16 NOTE — PROGRESS NOTES
EMG/NCS Bilateral    lower Completed    PCP: Mahsa Sanz MD    Ordering: James Mendoza Neurologist    Interpreting Physician: James Mendoza Neurologist    Technician: Carine Lagos RN

## 2024-04-17 ENCOUNTER — OFFICE VISIT (OUTPATIENT)
Dept: NEUROLOGY | Age: 44
End: 2024-04-17
Payer: COMMERCIAL

## 2024-04-17 VITALS
HEART RATE: 68 BPM | OXYGEN SATURATION: 97 % | WEIGHT: 220 LBS | SYSTOLIC BLOOD PRESSURE: 124 MMHG | DIASTOLIC BLOOD PRESSURE: 82 MMHG | BODY MASS INDEX: 36.61 KG/M2

## 2024-04-17 DIAGNOSIS — I67.82 CHRONIC CEREBRAL ISCHEMIA: ICD-10-CM

## 2024-04-17 DIAGNOSIS — R41.3 MEMORY PROBLEM: ICD-10-CM

## 2024-04-17 DIAGNOSIS — F41.9 ANXIETY AND DEPRESSION: ICD-10-CM

## 2024-04-17 DIAGNOSIS — G40.909 SEIZURE DISORDER (HCC): ICD-10-CM

## 2024-04-17 DIAGNOSIS — G89.29 CHRONIC BILATERAL LOW BACK PAIN WITH SCIATICA, SCIATICA LATERALITY UNSPECIFIED: ICD-10-CM

## 2024-04-17 DIAGNOSIS — G56.03 BILATERAL CARPAL TUNNEL SYNDROME: ICD-10-CM

## 2024-04-17 DIAGNOSIS — M54.2 NECK PAIN: ICD-10-CM

## 2024-04-17 DIAGNOSIS — G43.009 MIGRAINE WITHOUT AURA AND WITHOUT STATUS MIGRAINOSUS, NOT INTRACTABLE: ICD-10-CM

## 2024-04-17 DIAGNOSIS — R90.89 ABNORMAL FINDING ON MRI OF BRAIN: ICD-10-CM

## 2024-04-17 DIAGNOSIS — G89.29 CHRONIC NECK AND BACK PAIN: ICD-10-CM

## 2024-04-17 DIAGNOSIS — G57.43 TIBIAL NEUROPATHY OF BOTH LOWER EXTREMITIES: ICD-10-CM

## 2024-04-17 DIAGNOSIS — G44.1 OTHER VASCULAR HEADACHE: ICD-10-CM

## 2024-04-17 DIAGNOSIS — F39 MOOD DISORDER (HCC): ICD-10-CM

## 2024-04-17 DIAGNOSIS — F32.A ANXIETY AND DEPRESSION: ICD-10-CM

## 2024-04-17 DIAGNOSIS — G62.9 SENSORY NEUROPATHY: ICD-10-CM

## 2024-04-17 DIAGNOSIS — G47.9 SLEEP DIFFICULTIES: ICD-10-CM

## 2024-04-17 DIAGNOSIS — R42 DIZZINESS: Primary | ICD-10-CM

## 2024-04-17 DIAGNOSIS — M54.40 CHRONIC BILATERAL LOW BACK PAIN WITH SCIATICA, SCIATICA LATERALITY UNSPECIFIED: ICD-10-CM

## 2024-04-17 DIAGNOSIS — F32.89 OTHER DEPRESSION: ICD-10-CM

## 2024-04-17 DIAGNOSIS — M54.9 CHRONIC NECK AND BACK PAIN: ICD-10-CM

## 2024-04-17 DIAGNOSIS — M54.2 CHRONIC NECK AND BACK PAIN: ICD-10-CM

## 2024-04-17 DIAGNOSIS — Q28.3 CEREBRAL CAVERNOMA: ICD-10-CM

## 2024-04-17 PROCEDURE — 99214 OFFICE O/P EST MOD 30 MIN: CPT | Performed by: PSYCHIATRY & NEUROLOGY

## 2024-04-17 PROCEDURE — G8417 CALC BMI ABV UP PARAM F/U: HCPCS | Performed by: PSYCHIATRY & NEUROLOGY

## 2024-04-17 PROCEDURE — G8427 DOCREV CUR MEDS BY ELIG CLIN: HCPCS | Performed by: PSYCHIATRY & NEUROLOGY

## 2024-04-17 PROCEDURE — 1036F TOBACCO NON-USER: CPT | Performed by: PSYCHIATRY & NEUROLOGY

## 2024-04-17 PROCEDURE — 99213 OFFICE O/P EST LOW 20 MIN: CPT | Performed by: PSYCHIATRY & NEUROLOGY

## 2024-04-17 ASSESSMENT — ENCOUNTER SYMPTOMS
CHEST TIGHTNESS: 0
TROUBLE SWALLOWING: 0
WHEEZING: 0
FACIAL SWELLING: 0
CHOKING: 0
APNEA: 0
SHORTNESS OF BREATH: 0
VOICE CHANGE: 0

## 2024-04-17 NOTE — PROGRESS NOTES
[facility]  Neurology    [id number]  Phone:[phone]   Fax: [phone]        SUBJECTIVE:       PATIENT ID:  Modesto Ny is a  RIGHT     HANDED 44 y.o. male.      Neurologic Problem  The patient's primary symptoms include clumsiness and focal sensory loss. Primary symptoms comment: H/O  CHRONIC  MIGRAINES,  HEADACHES,  STARING  EPISODES,    MEMORY PROBLEMS,    DIZZINESS,  NEAR  SYNCOPE    CHRONIC  NECK AND  BACK PAIN. This is a chronic (FOR    11   YEARS) problem. The problem has been gradually worsening since onset. Pertinent negatives include no shortness of breath. Past treatments include bed rest and sleep. The treatment provided no relief. His past medical history is significant for dementia and seizures. There is no history of a bleeding disorder, a clotting disorder, a CVA or liver disease.         History obtained from  The   PATIENT    AND  HAYDEEE       and other  available   medical  records   were  Also  reviewed.          The  Duration,  Quality,  Severity,  Location,  Timing,  Context,  Modifying  Factors   Of   The   Chief   Complaint       And  Present  Illness  Was   Reviewed   In   Chronological   Manner.                                            PATIENT'S  MAIN  CONCERNS INCLUDE :                     1)      H/O    SEVERE  HEADACHE   THROBBING     BILATERAL                                       DIZZINESS,   NEAR  SYNCOPE     SINCE       2021                                      GETTING   WORSE    SINCE    AUG.   2023                                                                2)       H/O   CHRONIC    PAIN  IN  BACK  OF  NECK    SINCE     AUG. 2023                            3)        H/O   STARING  EPISODES     SINCE      2021                                    GETTING  WORSE  SINCE        AUG    2023                                       2- 3    TIMES  PER  WEEK       LASTING  UP  TO  ONE  MINUTE                               -

## 2024-04-30 ENCOUNTER — OFFICE VISIT (OUTPATIENT)
Dept: CARDIOLOGY | Age: 44
End: 2024-04-30
Payer: COMMERCIAL

## 2024-04-30 VITALS
SYSTOLIC BLOOD PRESSURE: 134 MMHG | DIASTOLIC BLOOD PRESSURE: 86 MMHG | BODY MASS INDEX: 37.19 KG/M2 | WEIGHT: 223.2 LBS | HEART RATE: 75 BPM | HEIGHT: 65 IN

## 2024-04-30 DIAGNOSIS — G47.33 OSA (OBSTRUCTIVE SLEEP APNEA): ICD-10-CM

## 2024-04-30 DIAGNOSIS — R55 NEAR SYNCOPE: Primary | ICD-10-CM

## 2024-04-30 DIAGNOSIS — R94.31 ABNORMAL ECG: ICD-10-CM

## 2024-04-30 DIAGNOSIS — R06.02 SOB (SHORTNESS OF BREATH): ICD-10-CM

## 2024-04-30 DIAGNOSIS — G90.1 DYSAUTONOMIA (HCC): ICD-10-CM

## 2024-04-30 DIAGNOSIS — R42 VERTIGO: ICD-10-CM

## 2024-04-30 DIAGNOSIS — F41.9 ANXIETY: ICD-10-CM

## 2024-04-30 DIAGNOSIS — R00.2 PALPITATIONS: ICD-10-CM

## 2024-04-30 DIAGNOSIS — I95.1 AUTONOMIC ORTHOSTATIC HYPOTENSION: ICD-10-CM

## 2024-04-30 DIAGNOSIS — R55 SYNCOPE AND COLLAPSE: ICD-10-CM

## 2024-04-30 DIAGNOSIS — E66.01 CLASS 2 SEVERE OBESITY DUE TO EXCESS CALORIES WITH SERIOUS COMORBIDITY IN ADULT, UNSPECIFIED BMI (HCC): ICD-10-CM

## 2024-04-30 PROCEDURE — G8427 DOCREV CUR MEDS BY ELIG CLIN: HCPCS | Performed by: INTERNAL MEDICINE

## 2024-04-30 PROCEDURE — 93005 ELECTROCARDIOGRAM TRACING: CPT | Performed by: INTERNAL MEDICINE

## 2024-04-30 PROCEDURE — 99214 OFFICE O/P EST MOD 30 MIN: CPT | Performed by: INTERNAL MEDICINE

## 2024-04-30 PROCEDURE — G8417 CALC BMI ABV UP PARAM F/U: HCPCS | Performed by: INTERNAL MEDICINE

## 2024-04-30 PROCEDURE — 93010 ELECTROCARDIOGRAM REPORT: CPT | Performed by: INTERNAL MEDICINE

## 2024-04-30 PROCEDURE — 99244 OFF/OP CNSLTJ NEW/EST MOD 40: CPT | Performed by: INTERNAL MEDICINE

## 2024-04-30 RX ORDER — CITALOPRAM 20 MG/1
20 TABLET ORAL DAILY
Qty: 30 TABLET | Refills: 0 | Status: SHIPPED | OUTPATIENT
Start: 2024-04-30
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
## 2024-05-09 ENCOUNTER — HOSPITAL ENCOUNTER (OUTPATIENT)
Age: 44
Discharge: HOME | End: 2024-05-11
Attending: INTERNAL MEDICINE
Payer: COMMERCIAL

## 2024-05-09 VITALS — WEIGHT: 223 LBS | HEIGHT: 65 IN | HEART RATE: 63 BPM | BODY MASS INDEX: 37.15 KG/M2

## 2024-05-09 DIAGNOSIS — F41.9 ANXIETY: ICD-10-CM

## 2024-05-09 DIAGNOSIS — R06.02 SOB (SHORTNESS OF BREATH): ICD-10-CM

## 2024-05-09 DIAGNOSIS — R55 SYNCOPE AND COLLAPSE: ICD-10-CM

## 2024-05-09 DIAGNOSIS — G90.1 DYSAUTONOMIA (HCC): ICD-10-CM

## 2024-05-09 DIAGNOSIS — R94.31 ABNORMAL ECG: ICD-10-CM

## 2024-05-09 DIAGNOSIS — R42 VERTIGO: ICD-10-CM

## 2024-05-09 DIAGNOSIS — E66.01 CLASS 2 SEVERE OBESITY DUE TO EXCESS CALORIES WITH SERIOUS COMORBIDITY IN ADULT, UNSPECIFIED BMI (HCC): ICD-10-CM

## 2024-05-09 DIAGNOSIS — R55 NEAR SYNCOPE: ICD-10-CM

## 2024-05-09 DIAGNOSIS — G47.33 OSA (OBSTRUCTIVE SLEEP APNEA): ICD-10-CM

## 2024-05-09 DIAGNOSIS — I95.1 AUTONOMIC ORTHOSTATIC HYPOTENSION: ICD-10-CM

## 2024-05-09 DIAGNOSIS — R00.2 PALPITATIONS: ICD-10-CM

## 2024-05-09 LAB
ECHO AO ROOT DIAM: 3.2 CM
ECHO AO ROOT INDEX: 1.55 CM/M2
ECHO AV AREA PEAK VELOCITY: 2.9 CM2
ECHO AV AREA/BSA PEAK VELOCITY: 1.4 CM2/M2
ECHO AV PEAK GRADIENT: 6 MMHG
ECHO AV PEAK VELOCITY: 1.2 M/S
ECHO AV VELOCITY RATIO: 0.75
ECHO BSA: 2.15 M2
ECHO IVC PROX: 1.6 CM
ECHO LA AREA 2C: 16.3 CM2
ECHO LA AREA 4C: 15.2 CM2
ECHO LA DIAMETER INDEX: 1.45 CM/M2
ECHO LA DIAMETER: 3 CM
ECHO LA MAJOR AXIS: 4.9 CM
ECHO LA MINOR AXIS: 4.9 CM
ECHO LA TO AORTIC ROOT RATIO: 0.94
ECHO LA VOL BP: 40 ML (ref 18–58)
ECHO LA VOL MOD A2C: 43 ML (ref 18–58)
ECHO LA VOL MOD A4C: 38 ML (ref 18–58)
ECHO LA VOL/BSA BIPLANE: 19 ML/M2 (ref 16–34)
ECHO LA VOLUME INDEX MOD A2C: 21 ML/M2 (ref 16–34)
ECHO LA VOLUME INDEX MOD A4C: 18 ML/M2 (ref 16–34)
ECHO LV E' LATERAL VELOCITY: 6 CM/S
ECHO LV E' SEPTAL VELOCITY: 7 CM/S
ECHO LV FRACTIONAL SHORTENING: 44 % (ref 28–44)
ECHO LV INTERNAL DIMENSION DIASTOLE INDEX: 2.32 CM/M2
ECHO LV INTERNAL DIMENSION DIASTOLIC: 4.8 CM (ref 4.2–5.9)
ECHO LV INTERNAL DIMENSION SYSTOLIC INDEX: 1.3 CM/M2
ECHO LV INTERNAL DIMENSION SYSTOLIC: 2.7 CM
ECHO LV IVSD: 1.3 CM (ref 0.6–1)
ECHO LV MASS 2D: 245.7 G (ref 88–224)
ECHO LV MASS INDEX 2D: 118.7 G/M2 (ref 49–115)
ECHO LV POSTERIOR WALL DIASTOLIC: 1.3 CM (ref 0.6–1)
ECHO LV RELATIVE WALL THICKNESS RATIO: 0.54
ECHO LVOT AREA: 3.8 CM2
ECHO LVOT DIAM: 2.2 CM
ECHO LVOT MEAN GRADIENT: 2 MMHG
ECHO LVOT PEAK GRADIENT: 3 MMHG
ECHO LVOT PEAK VELOCITY: 0.9 M/S
ECHO LVOT STROKE VOLUME INDEX: 29.7 ML/M2
ECHO LVOT SV: 61.6 ML
ECHO LVOT VTI: 16.2 CM
ECHO MV A VELOCITY: 0.66 M/S
ECHO MV E DECELERATION TIME (DT): 264 MS
ECHO MV E VELOCITY: 0.5 M/S
ECHO MV E/A RATIO: 0.76
ECHO MV E/E' LATERAL: 8.33
ECHO MV E/E' RATIO (AVERAGED): 7.74
ECHO MV E/E' SEPTAL: 7.14
ECHO RV TAPSE: 2.3 CM (ref 1.7–?)

## 2024-05-09 PROCEDURE — 93306 TTE W/DOPPLER COMPLETE: CPT | Performed by: INTERNAL MEDICINE

## 2024-05-09 PROCEDURE — 93306 TTE W/DOPPLER COMPLETE: CPT

## 2024-05-13 ENCOUNTER — TELEPHONE (OUTPATIENT)
Dept: CARDIOLOGY | Age: 44
End: 2024-05-13

## 2024-05-13 NOTE — TELEPHONE ENCOUNTER
Interpretation Summary         Left Ventricle: Normal left ventricular systolic function with a visually estimated EF of 55 - 60%. Left ventricle size is normal. Mildly increased wall thickness. Normal wall motion. Grade I diastolic dysfunction with normal LAP.    Right Ventricle: Right ventricle size is normal. Normal systolic function.    Aortic Valve: Mildly thickened cusp. No regurgitation.    Mitral Valve: Valve structure is normal. Trace regurgitation.    Tricuspid Valve: Valve structure is normal. Trace regurgitation.    Left Atrium: Left atrium size is normal.    Right Atrium: Right atrium size is normal.    Aorta: Normal sized aortic root and ascending aorta.    Pericardium: No pericardial effusion.    Image quality is technically difficult.     Echo Findings    Left Ventricle Normal left ventricular systolic function with a visually estimated EF of 55 - 60%. Left ventricle size is normal. Mildly increased wall thickness. Normal wall motion. Grade I diastolic dysfunction with normal LAP.   Left Atrium Left atrium size is normal.   Right Ventricle Right ventricle size is normal. Normal systolic function.   Right Atrium Right atrium size is normal.   Aortic Valve Mildly thickened cusp. No regurgitation. No stenosis.   Mitral Valve Valve structure is normal. Trace regurgitation. No stenosis noted.   Tricuspid Valve Valve structure is normal. Trace regurgitation. No stenosis noted. Unable to assess RVSP due to inadequate or insignificant tricuspid regurgitation.   Pulmonic Valve Valve structure is normal. No regurgitation. No stenosis noted.   Aorta Normal sized aortic root and ascending aorta.   IVC/Hepatic Veins IVC diameter is less than or equal to 21 mm and decreases greater than 50% during inspiration; therefore the estimated right atrial pressure is normal (~3 mmHg). IVC size is normal.   Pericardium No pericardial effusion.

## 2024-05-13 NOTE — TELEPHONE ENCOUNTER
Pt called for results of echo. Please call to advise. Pt is very anxious about the results.     [phone]    Last Appt:  4/30/2024  Next Appt:   10/9/2024  Med verified in Epic

## 2024-05-14 NOTE — TELEPHONE ENCOUNTER
Patient returned call to office. Patient notified of echo results and was advised to keep upcoming appts with holter and office visit. Pt verbalized understanding and had no further questions at the time.

## 2024-05-17 ENCOUNTER — OFFICE VISIT (OUTPATIENT)
Dept: NEUROSURGERY | Age: 44
End: 2024-05-17
Payer: COMMERCIAL

## 2024-05-17 VITALS
OXYGEN SATURATION: 95 % | HEART RATE: 65 BPM | BODY MASS INDEX: 36.46 KG/M2 | SYSTOLIC BLOOD PRESSURE: 136 MMHG | RESPIRATION RATE: 16 BRPM | DIASTOLIC BLOOD PRESSURE: 88 MMHG | WEIGHT: 218.8 LBS | HEIGHT: 65 IN

## 2024-05-17 DIAGNOSIS — G40.A09 ABSENCE SEIZURE (HCC): ICD-10-CM

## 2024-05-17 DIAGNOSIS — D18.00 CAVERNOMA: Primary | ICD-10-CM

## 2024-05-17 DIAGNOSIS — M48.02 STENOSIS OF CERVICAL SPINE WITH MYELOPATHY (HCC): ICD-10-CM

## 2024-05-17 DIAGNOSIS — G99.2 STENOSIS OF CERVICAL SPINE WITH MYELOPATHY (HCC): ICD-10-CM

## 2024-05-17 PROCEDURE — G8417 CALC BMI ABV UP PARAM F/U: HCPCS | Performed by: NEUROLOGICAL SURGERY

## 2024-05-17 PROCEDURE — 99204 OFFICE O/P NEW MOD 45 MIN: CPT | Performed by: NEUROLOGICAL SURGERY

## 2024-05-17 PROCEDURE — 1036F TOBACCO NON-USER: CPT | Performed by: NEUROLOGICAL SURGERY

## 2024-05-17 PROCEDURE — G8427 DOCREV CUR MEDS BY ELIG CLIN: HCPCS | Performed by: NEUROLOGICAL SURGERY

## 2024-05-17 PROCEDURE — 99213 OFFICE O/P EST LOW 20 MIN: CPT | Performed by: NEUROLOGICAL SURGERY

## 2024-05-17 NOTE — PROGRESS NOTES
No       Allergies   Allergen Reactions    Propoxyphene Other (See Comments), Nausea Only and Rash    Darvocet A500 [Propoxyphene N-Acetaminophen] Other (See Comments)     HOT AND SWEATING, NAUSEA       Review of Systems  ROS: unsteadiness, headache, dizziness.     Physical Exam:      /88   Pulse 65   Resp 16   Ht 1.651 m (5' 5\")   Wt 99.2 kg (218 lb 12.8 oz)   SpO2 95%   BMI 36.41 kg/m²   Estimated body mass index is 36.41 kg/m² as calculated from the following:    Height as of this encounter: 1.651 m (5' 5\").    Weight as of this encounter: 99.2 kg (218 lb 12.8 oz).    General:  Modesto Ny is a 44 y.o. year old male who appears his stated age.   HEENT: Normocephalic atraumatic. Neck supple.  Chest: regular rate; pulses equal. Equal chest rise and fall  Abdomen: Soft nondistended.   Ext: DP equal with good capillary refill  Neuro    Mentation  Appropriate affect   oriented    Cranial Nerves:   Pupils equal and reactive to light  Extraocular motion intact  Face symmetric  No dysarthria  v1-3 sensation symmetric, masseter tone symmetric  Hearing symmetric and intact to finger rub    Sensation:   Diminished UE distal     Motor  L deltoid 5/5; R deltoid 5/5  L biceps 5/5; R biceps 5/5  L triceps 5/5; R triceps 5/5  L wrist extension 5/5; R wrist extension 5/5  L intrinsics 5/5; R intrinsics 5/5     L iliopsoas 5/5 , R iliopsoas 5/5  L quadriceps 5/5; R quadriceps 5/5  L Dorsiflexion 5/5; R dorsiflexion 5/5  L Plantarflexion 5/5; R plantarflexion 5/5  L EHL 5/5; R EHL 5/5    Reflexes  L Brachioradialis 2+/4; R brachioradialis 2+/4  L Biceps 2+/4; R Biceps 2+/4  L Triceps 2+/4; R Triceps 2+/4  L Patellar 2+/4: R Patellar 2+/4  L Achilles 2+/4; R Achilles 2+/4    hoffmans L: neg  hoffmans R: neg  Clonus L: neg  Clonus R: neg  Babinski L: up  Babinski R; up    Studies Review:     MRI brain of the right lateral temporal approximately 1 cm x 1 cm cavernoma    MRI cervical spine with significant stenosis at

## 2024-05-28 ENCOUNTER — HOSPITAL ENCOUNTER (OUTPATIENT)
Age: 44
Discharge: HOME | End: 2024-05-30
Attending: INTERNAL MEDICINE
Payer: COMMERCIAL

## 2024-05-28 ENCOUNTER — HOSPITAL ENCOUNTER (OUTPATIENT)
Dept: GENERAL RADIOLOGY | Age: 44
Discharge: HOME | End: 2024-05-30
Attending: INTERNAL MEDICINE
Payer: COMMERCIAL

## 2024-05-28 DIAGNOSIS — R55 SYNCOPE AND COLLAPSE: ICD-10-CM

## 2024-05-28 DIAGNOSIS — M48.02 STENOSIS OF CERVICAL SPINE WITH MYELOPATHY (HCC): ICD-10-CM

## 2024-05-28 DIAGNOSIS — R55 NEAR SYNCOPE: ICD-10-CM

## 2024-05-28 DIAGNOSIS — G99.2 STENOSIS OF CERVICAL SPINE WITH MYELOPATHY (HCC): ICD-10-CM

## 2024-05-28 DIAGNOSIS — R06.02 SOB (SHORTNESS OF BREATH): ICD-10-CM

## 2024-05-28 DIAGNOSIS — E66.01 CLASS 2 SEVERE OBESITY DUE TO EXCESS CALORIES WITH SERIOUS COMORBIDITY IN ADULT, UNSPECIFIED BMI (HCC): ICD-10-CM

## 2024-05-28 DIAGNOSIS — F41.9 ANXIETY: ICD-10-CM

## 2024-05-28 DIAGNOSIS — G47.33 OSA (OBSTRUCTIVE SLEEP APNEA): ICD-10-CM

## 2024-05-28 DIAGNOSIS — G90.1 DYSAUTONOMIA (HCC): ICD-10-CM

## 2024-05-28 DIAGNOSIS — R94.31 ABNORMAL ECG: ICD-10-CM

## 2024-05-28 DIAGNOSIS — R00.2 PALPITATIONS: ICD-10-CM

## 2024-05-28 DIAGNOSIS — I95.1 AUTONOMIC ORTHOSTATIC HYPOTENSION: ICD-10-CM

## 2024-05-28 DIAGNOSIS — R42 VERTIGO: ICD-10-CM

## 2024-05-28 PROCEDURE — 72040 X-RAY EXAM NECK SPINE 2-3 VW: CPT

## 2024-05-28 PROCEDURE — 93225 XTRNL ECG REC<48 HRS REC: CPT

## 2024-06-03 ENCOUNTER — TELEPHONE (OUTPATIENT)
Dept: CARDIOLOGY | Age: 44
End: 2024-06-03

## 2024-06-03 NOTE — TELEPHONE ENCOUNTER
Please review holter and advise    Interpretation Summary    Basic rhythm normal sinus rhythm  Minimum heart rate 41 bpm at 6:09 AM  Average 69 bpm, maximum 132 bpm at 5:07 PM  Total 93 APCs seen, 4 atrial pair  Total 45 PVCs seen  No SVT no VT  No symptom diary

## 2024-06-17 ENCOUNTER — TELEPHONE (OUTPATIENT)
Dept: NEUROLOGY | Age: 44
End: 2024-06-17

## 2024-06-17 NOTE — TELEPHONE ENCOUNTER
06 17 2024 called the patient times 2 (05 17 2024 and 05 28 2024 at ) to schedule new patient appointment with one of our providers, left message on machine both times, no response.  I mailed the patient a letter asking them to call the office back to schedule this appointment.  KS

## 2024-10-02 ENCOUNTER — HOSPITAL ENCOUNTER (EMERGENCY)
Age: 44
Discharge: HOME | End: 2024-10-03
Attending: EMERGENCY MEDICINE
Payer: COMMERCIAL

## 2024-10-02 ENCOUNTER — APPOINTMENT (OUTPATIENT)
Dept: GENERAL RADIOLOGY | Age: 44
End: 2024-10-02
Payer: COMMERCIAL

## 2024-10-02 DIAGNOSIS — R07.9 CHEST PAIN, UNSPECIFIED TYPE: Primary | ICD-10-CM

## 2024-10-02 LAB
ANION GAP SERPL CALCULATED.3IONS-SCNC: 11 MMOL/L (ref 9–17)
BASOPHILS # BLD: 0.03 K/UL (ref 0–0.2)
BASOPHILS NFR BLD: 0 % (ref 0–2)
BUN SERPL-MCNC: 13 MG/DL (ref 6–20)
BUN/CREAT SERPL: 12 (ref 9–20)
CALCIUM SERPL-MCNC: 8.9 MG/DL (ref 8.6–10.4)
CHLORIDE SERPL-SCNC: 102 MMOL/L (ref 98–107)
CO2 SERPL-SCNC: 27 MMOL/L (ref 20–31)
CREAT SERPL-MCNC: 1.1 MG/DL (ref 0.7–1.2)
D DIMER PPP FEU-MCNC: <0.27 UG/ML FEU (ref 0–0.59)
EOSINOPHIL # BLD: 0.21 K/UL (ref 0–0.44)
EOSINOPHILS RELATIVE PERCENT: 3 % (ref 1–4)
ERYTHROCYTE [DISTWIDTH] IN BLOOD BY AUTOMATED COUNT: 12.7 % (ref 11.8–14.4)
GFR, ESTIMATED: 85 ML/MIN/1.73M2
GLUCOSE SERPL-MCNC: 124 MG/DL (ref 70–99)
HCT VFR BLD AUTO: 43 % (ref 40.7–50.3)
HGB BLD-MCNC: 15.4 G/DL (ref 13–17)
IMM GRANULOCYTES # BLD AUTO: <0.03 K/UL (ref 0–0.3)
IMM GRANULOCYTES NFR BLD: 0 %
LYMPHOCYTES NFR BLD: 2.05 K/UL (ref 1.1–3.7)
LYMPHOCYTES RELATIVE PERCENT: 27 % (ref 24–43)
MCH RBC QN AUTO: 32.3 PG (ref 25.2–33.5)
MCHC RBC AUTO-ENTMCNC: 35.8 G/DL (ref 25.2–33.5)
MCV RBC AUTO: 90.1 FL (ref 82.6–102.9)
MONOCYTES NFR BLD: 0.53 K/UL (ref 0.1–1.2)
MONOCYTES NFR BLD: 7 % (ref 3–12)
NEUTROPHILS NFR BLD: 63 % (ref 36–65)
NEUTS SEG NFR BLD: 4.78 K/UL (ref 1.5–8.1)
NRBC BLD-RTO: 0 PER 100 WBC
PLATELET # BLD AUTO: 246 K/UL (ref 138–453)
PMV BLD AUTO: 9.1 FL (ref 8.1–13.5)
POTASSIUM SERPL-SCNC: 3.8 MMOL/L (ref 3.7–5.3)
RBC # BLD AUTO: 4.77 M/UL (ref 4.21–5.77)
SODIUM SERPL-SCNC: 140 MMOL/L (ref 135–144)
TROPONIN I SERPL HS-MCNC: <6 NG/L (ref 0–22)
TSH SERPL DL<=0.05 MIU/L-ACNC: 4.84 UIU/ML (ref 0.3–5)
WBC OTHER # BLD: 7.6 K/UL (ref 3.5–11.3)

## 2024-10-02 PROCEDURE — 85379 FIBRIN DEGRADATION QUANT: CPT

## 2024-10-02 PROCEDURE — 96374 THER/PROPH/DIAG INJ IV PUSH: CPT

## 2024-10-02 PROCEDURE — 6360000002 HC RX W HCPCS: Performed by: EMERGENCY MEDICINE

## 2024-10-02 PROCEDURE — 85025 COMPLETE CBC W/AUTO DIFF WBC: CPT

## 2024-10-02 PROCEDURE — 93005 ELECTROCARDIOGRAM TRACING: CPT | Performed by: EMERGENCY MEDICINE

## 2024-10-02 PROCEDURE — 84443 ASSAY THYROID STIM HORMONE: CPT

## 2024-10-02 PROCEDURE — 71045 X-RAY EXAM CHEST 1 VIEW: CPT

## 2024-10-02 PROCEDURE — 80048 BASIC METABOLIC PNL TOTAL CA: CPT

## 2024-10-02 PROCEDURE — 99285 EMERGENCY DEPT VISIT HI MDM: CPT

## 2024-10-02 PROCEDURE — 84484 ASSAY OF TROPONIN QUANT: CPT

## 2024-10-02 RX ORDER — KETOROLAC TROMETHAMINE 30 MG/ML
15 INJECTION, SOLUTION INTRAMUSCULAR; INTRAVENOUS ONCE
Status: COMPLETED | OUTPATIENT
Start: 2024-10-02 | End: 2024-10-02

## 2024-10-02 RX ADMIN — KETOROLAC TROMETHAMINE 15 MG: 30 INJECTION, SOLUTION INTRAMUSCULAR at 23:16

## 2024-10-02 ASSESSMENT — PAIN - FUNCTIONAL ASSESSMENT: PAIN_FUNCTIONAL_ASSESSMENT: NONE - DENIES PAIN

## 2024-10-02 ASSESSMENT — PAIN DESCRIPTION - ORIENTATION
ORIENTATION: LEFT
ORIENTATION: LEFT

## 2024-10-02 ASSESSMENT — PAIN DESCRIPTION - LOCATION
LOCATION: CHEST
LOCATION: CHEST

## 2024-10-02 ASSESSMENT — PAIN SCALES - GENERAL: PAINLEVEL_OUTOF10: 6

## 2024-10-02 ASSESSMENT — PAIN DESCRIPTION - DESCRIPTORS
DESCRIPTORS: STABBING
DESCRIPTORS: STABBING

## 2024-10-03 VITALS
SYSTOLIC BLOOD PRESSURE: 136 MMHG | HEART RATE: 63 BPM | BODY MASS INDEX: 36.41 KG/M2 | TEMPERATURE: 97.8 F | OXYGEN SATURATION: 96 % | HEIGHT: 65 IN | RESPIRATION RATE: 21 BRPM | DIASTOLIC BLOOD PRESSURE: 92 MMHG

## 2024-10-03 LAB — TROPONIN I SERPL HS-MCNC: <6 NG/L (ref 0–22)

## 2024-10-03 PROCEDURE — 6370000000 HC RX 637 (ALT 250 FOR IP): Performed by: EMERGENCY MEDICINE

## 2024-10-03 PROCEDURE — 36415 COLL VENOUS BLD VENIPUNCTURE: CPT

## 2024-10-03 PROCEDURE — 84484 ASSAY OF TROPONIN QUANT: CPT

## 2024-10-03 PROCEDURE — 93005 ELECTROCARDIOGRAM TRACING: CPT | Performed by: EMERGENCY MEDICINE

## 2024-10-03 RX ORDER — ACETAMINOPHEN 500 MG
1000 TABLET ORAL ONCE
Status: COMPLETED | OUTPATIENT
Start: 2024-10-03 | End: 2024-10-03

## 2024-10-03 RX ADMIN — ACETAMINOPHEN 1000 MG: 500 TABLET ORAL at 00:40

## 2024-10-03 ASSESSMENT — ENCOUNTER SYMPTOMS
VOMITING: 0
SHORTNESS OF BREATH: 0
NAUSEA: 0
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
## 2024-10-03 NOTE — DISCHARGE INSTRUCTIONS
Please follow-up your PCP in 2 days.  You may use acetaminophen ibuprofen per over-the-counter instructions for return to the emergency department for return of chest pain, shortness of breath, passing out or passing out episodes, back pain, nausea vomiting, unexplained sweating, or any other acute concerns.    Please speak to your PCP to see if they want to pursue further cardiac evaluation or thyroid function studies.

## 2024-10-04 LAB
EKG ATRIAL RATE: 59 BPM
EKG ATRIAL RATE: 73 BPM
EKG P AXIS: 28 DEGREES
EKG P AXIS: 34 DEGREES
EKG P-R INTERVAL: 166 MS
EKG P-R INTERVAL: 180 MS
EKG Q-T INTERVAL: 402 MS
EKG Q-T INTERVAL: 432 MS
EKG QRS DURATION: 102 MS
EKG QRS DURATION: 98 MS
EKG QTC CALCULATION (BAZETT): 427 MS
EKG QTC CALCULATION (BAZETT): 442 MS
EKG R AXIS: 24 DEGREES
EKG R AXIS: 38 DEGREES
EKG T AXIS: 23 DEGREES
EKG T AXIS: 42 DEGREES
EKG VENTRICULAR RATE: 59 BPM
EKG VENTRICULAR RATE: 73 BPM

## 2024-10-09 ENCOUNTER — OFFICE VISIT (OUTPATIENT)
Dept: CARDIOLOGY | Age: 44
End: 2024-10-09
Payer: COMMERCIAL

## 2024-10-09 VITALS
WEIGHT: 222 LBS | SYSTOLIC BLOOD PRESSURE: 138 MMHG | HEIGHT: 65 IN | BODY MASS INDEX: 36.99 KG/M2 | HEART RATE: 60 BPM | DIASTOLIC BLOOD PRESSURE: 82 MMHG

## 2024-10-09 DIAGNOSIS — R00.2 PALPITATIONS: ICD-10-CM

## 2024-10-09 DIAGNOSIS — R07.9 CHEST PAIN, UNSPECIFIED TYPE: ICD-10-CM

## 2024-10-09 DIAGNOSIS — G47.30 SLEEP APNEA, UNSPECIFIED TYPE: ICD-10-CM

## 2024-10-09 DIAGNOSIS — R06.83 SNORING: ICD-10-CM

## 2024-10-09 DIAGNOSIS — R55 NEAR SYNCOPE: Primary | ICD-10-CM

## 2024-10-09 PROCEDURE — 1036F TOBACCO NON-USER: CPT | Performed by: NURSE PRACTITIONER

## 2024-10-09 PROCEDURE — 93010 ELECTROCARDIOGRAM REPORT: CPT | Performed by: NURSE PRACTITIONER

## 2024-10-09 PROCEDURE — 99214 OFFICE O/P EST MOD 30 MIN: CPT | Performed by: NURSE PRACTITIONER

## 2024-10-09 PROCEDURE — 93005 ELECTROCARDIOGRAM TRACING: CPT | Performed by: NURSE PRACTITIONER

## 2024-10-09 PROCEDURE — G8484 FLU IMMUNIZE NO ADMIN: HCPCS | Performed by: NURSE PRACTITIONER

## 2024-10-09 PROCEDURE — G8417 CALC BMI ABV UP PARAM F/U: HCPCS | Performed by: NURSE PRACTITIONER

## 2024-10-09 PROCEDURE — G8427 DOCREV CUR MEDS BY ELIG CLIN: HCPCS | Performed by: NURSE PRACTITIONER

## 2024-10-09 NOTE — PROGRESS NOTES
carotid bruit, no JVD  Lungs: clear to auscultation bilaterally  Heart:  regular rate and rhythm, S1, S2 normal, no Murmur  Abdomen: soft, non-tender; bowel sounds normal; no masses,  no organomegaly  Extremities: no site injection hematoma, extremities normal, atraumatic, no cyanosis. no edema  Neurologic: Mental status: Alert, oriented, thought content appropriate    Labs:  No results found for: \"CHOL\", \"TRIG\", \"HDL\", \"VLDL\", \"CHOLHDLRATIO\"    Lab Results   Component Value Date     10/02/2024    K 3.8 10/02/2024     10/02/2024    CO2 27 10/02/2024    BUN 13 10/02/2024    CREATININE 1.1 10/02/2024    GLUCOSE 124 (H) 10/02/2024    CALCIUM 8.9 10/02/2024    BILITOT 0.72 12/21/2017    ALKPHOS 93 12/21/2017    AST 25 12/21/2017    ALT 26 12/21/2017    LABGLOM 85 10/02/2024    GFRAA >60 12/21/2017       EKG: NSR 60    Holter monitor 6/3/24:  Basic rhythm normal sinus rhythm  Minimum heart rate 41 bpm at 6:09 AM  Average 69 bpm, maximum 132 bpm at 5:07 PM  Total 93 APCs seen, 4 atrial pair  Total 45 PVCs seen  No SVT no VT  No symptom diary    ECHO 5/9/24:     Left Ventricle: Normal left ventricular systolic function with a visually estimated EF of 55 - 60%. Left ventricle size is normal. Mildly increased wall thickness. Normal wall motion. Grade I diastolic dysfunction with normal LAP.    Right Ventricle: Right ventricle size is normal. Normal systolic function.    Aortic Valve: Mildly thickened cusp. No regurgitation.    Mitral Valve: Valve structure is normal. Trace regurgitation.    Tricuspid Valve: Valve structure is normal. Trace regurgitation.    Left Atrium: Left atrium size is normal.    Right Atrium: Right atrium size is normal.    Aorta: Normal sized aortic root and ascending aorta.    Pericardium: No pericardial effusion.    Image quality is technically difficult.    STRESS: NA    CATH: NA    Past Medical and Surgical History, Problem List, Allergies, Medications, Labs, Imaging, all reviewed

## 2024-10-11 ENCOUNTER — TELEPHONE (OUTPATIENT)
Dept: CARDIOLOGY | Age: 44
End: 2024-10-11

## 2024-10-11 NOTE — TELEPHONE ENCOUNTER
Nathalia from sleep lab needs current sleep study order canceled and re entered. Will need new order with working diagnosis as well as documentation within the H and P that supports the need for home sleep study. Nathalia states that if we need a generic diagnosis code to push this through we can use \"sleep apnea unspecified\" Please advise. Thank you.

## 2024-10-17 ENCOUNTER — HOSPITAL ENCOUNTER (OUTPATIENT)
Age: 44
Discharge: HOME | End: 2024-10-19
Payer: COMMERCIAL

## 2024-10-17 DIAGNOSIS — R07.9 CHEST PAIN, UNSPECIFIED TYPE: ICD-10-CM

## 2024-10-17 LAB
STRESS BASELINE DIAS BP: 95 MMHG
STRESS BASELINE HR: 66 BPM
STRESS BASELINE SYS BP: 145 MMHG
STRESS ESTIMATED WORKLOAD: 10.5 METS
STRESS EXERCISE DUR MIN: 9 MIN
STRESS EXERCISE DUR SEC: 16 SEC
STRESS PEAK DIAS BP: 81 MMHG
STRESS PEAK SYS BP: 214 MMHG
STRESS PERCENT HR ACHIEVED: 89 %
STRESS POST PEAK HR: 157 BPM
STRESS RATE PRESSURE PRODUCT: NORMAL BPM*MMHG
STRESS TARGET HR: 177 BPM

## 2024-10-17 PROCEDURE — 93017 CV STRESS TEST TRACING ONLY: CPT

## 2024-10-21 ENCOUNTER — TELEPHONE (OUTPATIENT)
Dept: CARDIOLOGY | Age: 44
End: 2024-10-21

## 2024-10-21 NOTE — TELEPHONE ENCOUNTER
Interpretation Summary      ECG: Resting ECG demonstrates normal sinus rhythm.    ECG: The stress ECG was negative for ischemia.    Stress Test: A Moose protocol stress test was performed. The patient reached stage 4 of the protocol and was stressed for 9 min and 16 sec.

## 2024-10-21 NOTE — TELEPHONE ENCOUNTER
Notified patient Stress results. Instructed patient to keep upcoming appointment with cardiologist and to call our office for any questions.

## 2025-03-04 ENCOUNTER — TELEPHONE (OUTPATIENT)
Dept: SLEEP CENTER | Age: 45
End: 2025-03-04

## 2025-03-04 NOTE — TELEPHONE ENCOUNTER
10/25/24 sent info packet to patient for home sleep study. 1/3/25 resent home packet to patient and rechecked insurance, 1/20/25 pt states coming by the sleep lab to get packet.1/24/25 called pt left message he has not come by to get the packet. 1/27/25 mailed the packet out again. Patient has never attempted to reach back out about the home sleep study, at this time if patient would like to revisit doing the home sleep study pt will need to see doctor for an updated H&P and orders.

## 2025-06-03 ENCOUNTER — OFFICE VISIT (OUTPATIENT)
Dept: PRIMARY CARE CLINIC | Age: 45
End: 2025-06-03
Payer: COMMERCIAL

## 2025-06-03 VITALS
HEART RATE: 70 BPM | SYSTOLIC BLOOD PRESSURE: 120 MMHG | HEIGHT: 65 IN | WEIGHT: 228 LBS | BODY MASS INDEX: 37.99 KG/M2 | OXYGEN SATURATION: 97 % | RESPIRATION RATE: 18 BRPM | DIASTOLIC BLOOD PRESSURE: 84 MMHG | TEMPERATURE: 99 F

## 2025-06-03 DIAGNOSIS — S83.92XA SPRAIN OF LEFT KNEE, UNSPECIFIED LIGAMENT, INITIAL ENCOUNTER: Primary | ICD-10-CM

## 2025-06-03 PROCEDURE — 99203 OFFICE O/P NEW LOW 30 MIN: CPT | Performed by: PHYSICIAN ASSISTANT

## 2025-06-03 PROCEDURE — A6450 LT COMPRES BAND >=5"/YD: HCPCS | Performed by: PHYSICIAN ASSISTANT

## 2025-06-03 PROCEDURE — G8427 DOCREV CUR MEDS BY ELIG CLIN: HCPCS | Performed by: PHYSICIAN ASSISTANT

## 2025-06-03 PROCEDURE — 1036F TOBACCO NON-USER: CPT | Performed by: PHYSICIAN ASSISTANT

## 2025-06-03 PROCEDURE — G8417 CALC BMI ABV UP PARAM F/U: HCPCS | Performed by: PHYSICIAN ASSISTANT

## 2025-06-03 RX ORDER — KETOROLAC TROMETHAMINE 10 MG/1
10 TABLET, FILM COATED ORAL EVERY 6 HOURS PRN
Qty: 20 TABLET | Refills: 0 | Status: SHIPPED | OUTPATIENT
Start: 2025-06-03 | End: 2025-06-13

## 2025-06-03 SDOH — ECONOMIC STABILITY: FOOD INSECURITY: WITHIN THE PAST 12 MONTHS, THE FOOD YOU BOUGHT JUST DIDN'T LAST AND YOU DIDN'T HAVE MONEY TO GET MORE.: NEVER TRUE

## 2025-06-03 SDOH — ECONOMIC STABILITY: FOOD INSECURITY: WITHIN THE PAST 12 MONTHS, YOU WORRIED THAT YOUR FOOD WOULD RUN OUT BEFORE YOU GOT MONEY TO BUY MORE.: NEVER TRUE

## 2025-06-03 ASSESSMENT — ENCOUNTER SYMPTOMS: SHORTNESS OF BREATH: 0

## 2025-06-03 ASSESSMENT — PATIENT HEALTH QUESTIONNAIRE - PHQ9: DEPRESSION UNABLE TO ASSESS: PT REFUSES

## 2025-06-03 NOTE — PATIENT INSTRUCTIONS
Keep left knee in brace while ambulating  Apply ice 20 mins at a time 4-6 times day. May alternate with heat  May use Toradol as prescribed for pain  Decrease activity today and tomorrow may increase as tolerated after two days of rest  Brace applied, patient tolerated well, discussed not to wear too tight  If symptoms worsen follow up with PCP  Patient verbalized understanding and agrees with plan of care
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
## 2025-06-24 ENCOUNTER — OFFICE VISIT (OUTPATIENT)
Dept: ORTHOPEDIC SURGERY | Age: 45
End: 2025-06-24
Payer: COMMERCIAL

## 2025-06-24 VITALS
WEIGHT: 228 LBS | BODY MASS INDEX: 37.99 KG/M2 | DIASTOLIC BLOOD PRESSURE: 86 MMHG | HEIGHT: 65 IN | SYSTOLIC BLOOD PRESSURE: 130 MMHG | HEART RATE: 80 BPM

## 2025-06-24 DIAGNOSIS — M25.562 LEFT KNEE PAIN, UNSPECIFIED CHRONICITY: Primary | ICD-10-CM

## 2025-06-24 DIAGNOSIS — M25.362 KNEE INSTABILITY, LEFT: ICD-10-CM

## 2025-06-24 PROCEDURE — 99214 OFFICE O/P EST MOD 30 MIN: CPT | Performed by: PHYSICIAN ASSISTANT
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
## 2025-07-09 ENCOUNTER — HOSPITAL ENCOUNTER (OUTPATIENT)
Dept: MRI IMAGING | Age: 45
Discharge: HOME | End: 2025-07-11
Payer: COMMERCIAL

## 2025-07-09 DIAGNOSIS — M25.362 KNEE INSTABILITY, LEFT: ICD-10-CM

## 2025-07-09 DIAGNOSIS — M25.562 LEFT KNEE PAIN, UNSPECIFIED CHRONICITY: ICD-10-CM

## 2025-07-09 PROCEDURE — 73721 MRI JNT OF LWR EXTRE W/O DYE: CPT

## 2025-07-15 ENCOUNTER — TELEPHONE (OUTPATIENT)
Dept: ORTHOPEDIC SURGERY | Age: 45
End: 2025-07-15

## 2025-07-15 DIAGNOSIS — M25.562 LEFT KNEE PAIN, UNSPECIFIED CHRONICITY: Primary | ICD-10-CM

## 2025-07-15 DIAGNOSIS — M25.362 KNEE INSTABILITY, LEFT: ICD-10-CM

## 2025-07-15 NOTE — TELEPHONE ENCOUNTER
MRI reviewed by provider  Physical therapy recommended   Order written for physical therapy  Patient aware

## 2025-07-29 ENCOUNTER — TELEPHONE (OUTPATIENT)
Dept: ORTHOPEDIC SURGERY | Age: 45
End: 2025-07-29

## 2025-07-29 NOTE — TELEPHONE ENCOUNTER
Patient called in stating he is having a lot of knee patient.    Patient was informed Dr. Hancock's group will be in [place] tomorrow or he may go to Urgent care today.    Patient stated he is going to go to urgent care

## 2025-08-12 ENCOUNTER — OFFICE VISIT (OUTPATIENT)
Dept: ORTHOPEDIC SURGERY | Age: 45
End: 2025-08-12
Payer: COMMERCIAL

## 2025-08-12 VITALS
HEIGHT: 63 IN | SYSTOLIC BLOOD PRESSURE: 173 MMHG | WEIGHT: 228 LBS | DIASTOLIC BLOOD PRESSURE: 99 MMHG | BODY MASS INDEX: 40.4 KG/M2 | HEART RATE: 64 BPM

## 2025-08-12 DIAGNOSIS — M25.362 KNEE INSTABILITY, LEFT: ICD-10-CM

## 2025-08-12 DIAGNOSIS — M25.562 LEFT KNEE PAIN, UNSPECIFIED CHRONICITY: Primary | ICD-10-CM

## 2025-08-12 PROCEDURE — 99213 OFFICE O/P EST LOW 20 MIN: CPT | Performed by: PHYSICIAN ASSISTANT

## 2025-08-12 PROCEDURE — G8427 DOCREV CUR MEDS BY ELIG CLIN: HCPCS | Performed by: PHYSICIAN ASSISTANT

## 2025-08-12 PROCEDURE — G8417 CALC BMI ABV UP PARAM F/U: HCPCS | Performed by: PHYSICIAN ASSISTANT

## 2025-08-12 PROCEDURE — 1036F TOBACCO NON-USER: CPT | Performed by: PHYSICIAN ASSISTANT

## 2025-08-14 ENCOUNTER — HOSPITAL ENCOUNTER (OUTPATIENT)
Dept: PHYSICAL THERAPY | Age: 45
Setting detail: THERAPIES SERIES
Discharge: HOME | End: 2025-08-14